# Patient Record
Sex: MALE | Race: WHITE | Employment: OTHER | URBAN - METROPOLITAN AREA
[De-identification: names, ages, dates, MRNs, and addresses within clinical notes are randomized per-mention and may not be internally consistent; named-entity substitution may affect disease eponyms.]

---

## 2019-03-16 ENCOUNTER — ANESTHESIA EVENT (OUTPATIENT)
Dept: OPERATING ROOM | Age: 74
DRG: 856 | End: 2019-03-16
Payer: MEDICARE

## 2019-03-16 ENCOUNTER — ANESTHESIA (OUTPATIENT)
Dept: OPERATING ROOM | Age: 74
DRG: 856 | End: 2019-03-16
Payer: MEDICARE

## 2019-03-16 ENCOUNTER — HOSPITAL ENCOUNTER (INPATIENT)
Age: 74
LOS: 1 days | Discharge: HOME HEALTH | DRG: 856 | End: 2019-03-19
Attending: NEUROLOGICAL SURGERY | Admitting: NEUROLOGICAL SURGERY
Payer: MEDICARE

## 2019-03-16 VITALS — SYSTOLIC BLOOD PRESSURE: 125 MMHG | TEMPERATURE: 98.8 F | DIASTOLIC BLOOD PRESSURE: 69 MMHG | OXYGEN SATURATION: 100 %

## 2019-03-16 PROBLEM — L08.9 WOUND INFECTION: Status: ACTIVE | Noted: 2019-03-16

## 2019-03-16 PROBLEM — T14.8XXA WOUND INFECTION: Status: ACTIVE | Noted: 2019-03-16

## 2019-03-16 LAB
DIRECT EXAM: NORMAL
EKG ATRIAL RATE: 95 BPM
EKG P AXIS: 67 DEGREES
EKG P-R INTERVAL: 156 MS
EKG Q-T INTERVAL: 318 MS
EKG QRS DURATION: 82 MS
EKG QTC CALCULATION (BAZETT): 399 MS
EKG R AXIS: 53 DEGREES
EKG T AXIS: 88 DEGREES
EKG VENTRICULAR RATE: 95 BPM
GLUCOSE BLD-MCNC: 127 MG/DL (ref 75–110)
Lab: NORMAL
SPECIMEN DESCRIPTION: NORMAL

## 2019-03-16 PROCEDURE — 2580000003 HC RX 258: Performed by: NURSE ANESTHETIST, CERTIFIED REGISTERED

## 2019-03-16 PROCEDURE — 6370000000 HC RX 637 (ALT 250 FOR IP): Performed by: NEUROLOGICAL SURGERY

## 2019-03-16 PROCEDURE — 84520 ASSAY OF UREA NITROGEN: CPT

## 2019-03-16 PROCEDURE — 2580000003 HC RX 258: Performed by: ANESTHESIOLOGY

## 2019-03-16 PROCEDURE — 2580000003 HC RX 258: Performed by: NEUROLOGICAL SURGERY

## 2019-03-16 PROCEDURE — 99222 1ST HOSP IP/OBS MODERATE 55: CPT | Performed by: INTERNAL MEDICINE

## 2019-03-16 PROCEDURE — 87186 SC STD MICRODIL/AGAR DIL: CPT

## 2019-03-16 PROCEDURE — 3600000014 HC SURGERY LEVEL 4 ADDTL 15MIN: Performed by: NEUROLOGICAL SURGERY

## 2019-03-16 PROCEDURE — 6370000000 HC RX 637 (ALT 250 FOR IP): Performed by: ANESTHESIOLOGY

## 2019-03-16 PROCEDURE — 2500000003 HC RX 250 WO HCPCS: Performed by: NEUROLOGICAL SURGERY

## 2019-03-16 PROCEDURE — 3600000004 HC SURGERY LEVEL 4 BASE: Performed by: NEUROLOGICAL SURGERY

## 2019-03-16 PROCEDURE — 87040 BLOOD CULTURE FOR BACTERIA: CPT

## 2019-03-16 PROCEDURE — 0J970ZZ DRAINAGE OF BACK SUBCUTANEOUS TISSUE AND FASCIA, OPEN APPROACH: ICD-10-PCS | Performed by: NEUROLOGICAL SURGERY

## 2019-03-16 PROCEDURE — 6360000002 HC RX W HCPCS: Performed by: ANESTHESIOLOGY

## 2019-03-16 PROCEDURE — 87070 CULTURE OTHR SPECIMN AEROBIC: CPT

## 2019-03-16 PROCEDURE — 3700000001 HC ADD 15 MINUTES (ANESTHESIA): Performed by: NEUROLOGICAL SURGERY

## 2019-03-16 PROCEDURE — 3700000000 HC ANESTHESIA ATTENDED CARE: Performed by: NEUROLOGICAL SURGERY

## 2019-03-16 PROCEDURE — 82565 ASSAY OF CREATININE: CPT

## 2019-03-16 PROCEDURE — 2500000003 HC RX 250 WO HCPCS: Performed by: NURSE ANESTHETIST, CERTIFIED REGISTERED

## 2019-03-16 PROCEDURE — 2709999900 HC NON-CHARGEABLE SUPPLY: Performed by: NEUROLOGICAL SURGERY

## 2019-03-16 PROCEDURE — 6360000002 HC RX W HCPCS: Performed by: NEUROLOGICAL SURGERY

## 2019-03-16 PROCEDURE — 6360000002 HC RX W HCPCS: Performed by: NURSE ANESTHETIST, CERTIFIED REGISTERED

## 2019-03-16 PROCEDURE — 86403 PARTICLE AGGLUT ANTBDY SCRN: CPT

## 2019-03-16 PROCEDURE — 7100000001 HC PACU RECOVERY - ADDTL 15 MIN: Performed by: NEUROLOGICAL SURGERY

## 2019-03-16 PROCEDURE — 82947 ASSAY GLUCOSE BLOOD QUANT: CPT

## 2019-03-16 PROCEDURE — G0378 HOSPITAL OBSERVATION PER HR: HCPCS

## 2019-03-16 PROCEDURE — 7100000000 HC PACU RECOVERY - FIRST 15 MIN: Performed by: NEUROLOGICAL SURGERY

## 2019-03-16 PROCEDURE — 87205 SMEAR GRAM STAIN: CPT

## 2019-03-16 PROCEDURE — 87075 CULTR BACTERIA EXCEPT BLOOD: CPT

## 2019-03-16 PROCEDURE — 36415 COLL VENOUS BLD VENIPUNCTURE: CPT

## 2019-03-16 PROCEDURE — 93005 ELECTROCARDIOGRAM TRACING: CPT

## 2019-03-16 RX ORDER — DOCUSATE SODIUM 100 MG/1
100 CAPSULE, LIQUID FILLED ORAL 2 TIMES DAILY
Status: DISCONTINUED | OUTPATIENT
Start: 2019-03-16 | End: 2019-03-19 | Stop reason: HOSPADM

## 2019-03-16 RX ORDER — SODIUM CHLORIDE 0.9 % (FLUSH) 0.9 %
10 SYRINGE (ML) INJECTION EVERY 12 HOURS SCHEDULED
Status: DISCONTINUED | OUTPATIENT
Start: 2019-03-16 | End: 2019-03-16 | Stop reason: HOSPADM

## 2019-03-16 RX ORDER — MORPHINE SULFATE 4 MG/ML
4 INJECTION, SOLUTION INTRAMUSCULAR; INTRAVENOUS
Status: DISCONTINUED | OUTPATIENT
Start: 2019-03-16 | End: 2019-03-19 | Stop reason: HOSPADM

## 2019-03-16 RX ORDER — ONDANSETRON 4 MG/1
4 TABLET, FILM COATED ORAL ONCE
Status: COMPLETED | OUTPATIENT
Start: 2019-03-16 | End: 2019-03-16

## 2019-03-16 RX ORDER — ATORVASTATIN CALCIUM 20 MG/1
20 TABLET, FILM COATED ORAL
COMMUNITY

## 2019-03-16 RX ORDER — SODIUM CHLORIDE 0.9 % (FLUSH) 0.9 %
10 SYRINGE (ML) INJECTION PRN
Status: DISCONTINUED | OUTPATIENT
Start: 2019-03-16 | End: 2019-03-16 | Stop reason: HOSPADM

## 2019-03-16 RX ORDER — MORPHINE SULFATE 2 MG/ML
2 INJECTION, SOLUTION INTRAMUSCULAR; INTRAVENOUS
Status: DISCONTINUED | OUTPATIENT
Start: 2019-03-16 | End: 2019-03-19 | Stop reason: HOSPADM

## 2019-03-16 RX ORDER — ONDANSETRON 2 MG/ML
4 INJECTION INTRAMUSCULAR; INTRAVENOUS EVERY 6 HOURS PRN
Status: DISCONTINUED | OUTPATIENT
Start: 2019-03-16 | End: 2019-03-19 | Stop reason: HOSPADM

## 2019-03-16 RX ORDER — TIZANIDINE 2 MG/1
2 TABLET ORAL
Status: ON HOLD | COMMUNITY
End: 2019-03-19 | Stop reason: HOSPADM

## 2019-03-16 RX ORDER — ONDANSETRON 4 MG/1
4 TABLET, FILM COATED ORAL
Status: ON HOLD | COMMUNITY
Start: 2019-03-14 | End: 2019-03-19 | Stop reason: HOSPADM

## 2019-03-16 RX ORDER — SODIUM CHLORIDE 0.9 % (FLUSH) 0.9 %
10 SYRINGE (ML) INJECTION PRN
Status: DISCONTINUED | OUTPATIENT
Start: 2019-03-16 | End: 2019-03-19 | Stop reason: HOSPADM

## 2019-03-16 RX ORDER — ONDANSETRON 2 MG/ML
INJECTION INTRAMUSCULAR; INTRAVENOUS PRN
Status: DISCONTINUED | OUTPATIENT
Start: 2019-03-16 | End: 2019-03-16 | Stop reason: SDUPTHER

## 2019-03-16 RX ORDER — NEOSTIGMINE METHYLSULFATE 5 MG/5 ML
SYRINGE (ML) INTRAVENOUS PRN
Status: DISCONTINUED | OUTPATIENT
Start: 2019-03-16 | End: 2019-03-16 | Stop reason: SDUPTHER

## 2019-03-16 RX ORDER — SCOLOPAMINE TRANSDERMAL SYSTEM 1 MG/1
1 PATCH, EXTENDED RELEASE TRANSDERMAL
Status: DISCONTINUED | OUTPATIENT
Start: 2019-03-16 | End: 2019-03-16

## 2019-03-16 RX ORDER — OXYCODONE HYDROCHLORIDE AND ACETAMINOPHEN 5; 325 MG/1; MG/1
1 TABLET ORAL EVERY 8 HOURS PRN
COMMUNITY

## 2019-03-16 RX ORDER — SODIUM CHLORIDE, SODIUM LACTATE, POTASSIUM CHLORIDE, CALCIUM CHLORIDE 600; 310; 30; 20 MG/100ML; MG/100ML; MG/100ML; MG/100ML
INJECTION, SOLUTION INTRAVENOUS CONTINUOUS PRN
Status: DISCONTINUED | OUTPATIENT
Start: 2019-03-16 | End: 2019-03-16 | Stop reason: SDUPTHER

## 2019-03-16 RX ORDER — TIZANIDINE 2 MG/1
2 TABLET ORAL 3 TIMES DAILY
Status: DISCONTINUED | OUTPATIENT
Start: 2019-03-16 | End: 2019-03-19 | Stop reason: HOSPADM

## 2019-03-16 RX ORDER — HYDROCODONE BITARTRATE AND ACETAMINOPHEN 5; 325 MG/1; MG/1
2 TABLET ORAL EVERY 4 HOURS PRN
Status: DISCONTINUED | OUTPATIENT
Start: 2019-03-16 | End: 2019-03-19 | Stop reason: HOSPADM

## 2019-03-16 RX ORDER — HYDROCODONE BITARTRATE AND ACETAMINOPHEN 5; 325 MG/1; MG/1
1 TABLET ORAL EVERY 4 HOURS PRN
Status: DISCONTINUED | OUTPATIENT
Start: 2019-03-16 | End: 2019-03-19 | Stop reason: HOSPADM

## 2019-03-16 RX ORDER — CEFAZOLIN SODIUM 1 G/3ML
INJECTION, POWDER, FOR SOLUTION INTRAMUSCULAR; INTRAVENOUS PRN
Status: DISCONTINUED | OUTPATIENT
Start: 2019-03-16 | End: 2019-03-16 | Stop reason: SDUPTHER

## 2019-03-16 RX ORDER — SODIUM CHLORIDE, SODIUM LACTATE, POTASSIUM CHLORIDE, CALCIUM CHLORIDE 600; 310; 30; 20 MG/100ML; MG/100ML; MG/100ML; MG/100ML
INJECTION, SOLUTION INTRAVENOUS CONTINUOUS
Status: DISCONTINUED | OUTPATIENT
Start: 2019-03-16 | End: 2019-03-16

## 2019-03-16 RX ORDER — SODIUM CHLORIDE 9 MG/ML
INJECTION, SOLUTION INTRAVENOUS CONTINUOUS
Status: DISCONTINUED | OUTPATIENT
Start: 2019-03-16 | End: 2019-03-19 | Stop reason: HOSPADM

## 2019-03-16 RX ORDER — FENTANYL CITRATE 50 UG/ML
25 INJECTION, SOLUTION INTRAMUSCULAR; INTRAVENOUS EVERY 5 MIN PRN
Status: DISCONTINUED | OUTPATIENT
Start: 2019-03-16 | End: 2019-03-16 | Stop reason: HOSPADM

## 2019-03-16 RX ORDER — GLYCOPYRROLATE 1 MG/5 ML
SYRINGE (ML) INTRAVENOUS PRN
Status: DISCONTINUED | OUTPATIENT
Start: 2019-03-16 | End: 2019-03-16 | Stop reason: SDUPTHER

## 2019-03-16 RX ORDER — PROPOFOL 10 MG/ML
INJECTION, EMULSION INTRAVENOUS PRN
Status: DISCONTINUED | OUTPATIENT
Start: 2019-03-16 | End: 2019-03-16 | Stop reason: SDUPTHER

## 2019-03-16 RX ORDER — ATORVASTATIN CALCIUM 20 MG/1
20 TABLET, FILM COATED ORAL DAILY
Status: DISCONTINUED | OUTPATIENT
Start: 2019-03-16 | End: 2019-03-19 | Stop reason: HOSPADM

## 2019-03-16 RX ORDER — FENTANYL CITRATE 50 UG/ML
50 INJECTION, SOLUTION INTRAMUSCULAR; INTRAVENOUS EVERY 5 MIN PRN
Status: DISCONTINUED | OUTPATIENT
Start: 2019-03-16 | End: 2019-03-16 | Stop reason: HOSPADM

## 2019-03-16 RX ORDER — AMLODIPINE BESYLATE 2.5 MG/1
2.5 TABLET ORAL DAILY
Status: DISCONTINUED | OUTPATIENT
Start: 2019-03-16 | End: 2019-03-19 | Stop reason: HOSPADM

## 2019-03-16 RX ORDER — SODIUM CHLORIDE 0.9 % (FLUSH) 0.9 %
10 SYRINGE (ML) INJECTION EVERY 12 HOURS SCHEDULED
Status: DISCONTINUED | OUTPATIENT
Start: 2019-03-16 | End: 2019-03-19 | Stop reason: HOSPADM

## 2019-03-16 RX ORDER — LIDOCAINE HYDROCHLORIDE 10 MG/ML
INJECTION, SOLUTION EPIDURAL; INFILTRATION; INTRACAUDAL; PERINEURAL PRN
Status: DISCONTINUED | OUTPATIENT
Start: 2019-03-16 | End: 2019-03-16 | Stop reason: SDUPTHER

## 2019-03-16 RX ORDER — CEPHALEXIN 250 MG/1
250 CAPSULE ORAL
Status: ON HOLD | COMMUNITY
End: 2019-03-18 | Stop reason: HOSPADM

## 2019-03-16 RX ORDER — AMLODIPINE BESYLATE 2.5 MG/1
2.5 TABLET ORAL DAILY
COMMUNITY

## 2019-03-16 RX ORDER — ROCURONIUM BROMIDE 10 MG/ML
INJECTION, SOLUTION INTRAVENOUS PRN
Status: DISCONTINUED | OUTPATIENT
Start: 2019-03-16 | End: 2019-03-16 | Stop reason: SDUPTHER

## 2019-03-16 RX ADMIN — Medication 10 ML: at 20:23

## 2019-03-16 RX ADMIN — FENTANYL CITRATE 50 MCG: 50 INJECTION, SOLUTION INTRAMUSCULAR; INTRAVENOUS at 12:04

## 2019-03-16 RX ADMIN — ONDANSETRON 4 MG: 2 INJECTION INTRAMUSCULAR; INTRAVENOUS at 09:45

## 2019-03-16 RX ADMIN — Medication 3 MG: at 09:55

## 2019-03-16 RX ADMIN — SODIUM CHLORIDE, POTASSIUM CHLORIDE, SODIUM LACTATE AND CALCIUM CHLORIDE: 600; 310; 30; 20 INJECTION, SOLUTION INTRAVENOUS at 08:28

## 2019-03-16 RX ADMIN — Medication 0.4 MG: at 09:57

## 2019-03-16 RX ADMIN — DOCUSATE SODIUM 100 MG: 100 CAPSULE, LIQUID FILLED ORAL at 20:22

## 2019-03-16 RX ADMIN — ONDANSETRON HYDROCHLORIDE 4 MG: 4 TABLET, FILM COATED ORAL at 14:39

## 2019-03-16 RX ADMIN — HYDROCODONE BITARTRATE AND ACETAMINOPHEN 2 TABLET: 5; 325 TABLET ORAL at 13:42

## 2019-03-16 RX ADMIN — ROCURONIUM BROMIDE 30 MG: 10 INJECTION INTRAVENOUS at 09:36

## 2019-03-16 RX ADMIN — AMLODIPINE BESYLATE 2.5 MG: 2.5 TABLET ORAL at 13:43

## 2019-03-16 RX ADMIN — HYDROCODONE BITARTRATE AND ACETAMINOPHEN 2 TABLET: 5; 325 TABLET ORAL at 23:07

## 2019-03-16 RX ADMIN — TIZANIDINE 2 MG: 2 TABLET ORAL at 13:43

## 2019-03-16 RX ADMIN — DOCUSATE SODIUM 100 MG: 100 CAPSULE, LIQUID FILLED ORAL at 13:43

## 2019-03-16 RX ADMIN — VANCOMYCIN HYDROCHLORIDE 1500 MG: 10 INJECTION, POWDER, LYOPHILIZED, FOR SOLUTION INTRAVENOUS at 14:39

## 2019-03-16 RX ADMIN — LIDOCAINE HYDROCHLORIDE 50 MG: 10 INJECTION, SOLUTION EPIDURAL; INFILTRATION; INTRACAUDAL; PERINEURAL at 09:36

## 2019-03-16 RX ADMIN — SODIUM CHLORIDE, POTASSIUM CHLORIDE, SODIUM LACTATE AND CALCIUM CHLORIDE: 600; 310; 30; 20 INJECTION, SOLUTION INTRAVENOUS at 09:15

## 2019-03-16 RX ADMIN — FENTANYL CITRATE 100 MCG: 50 INJECTION INTRAMUSCULAR; INTRAVENOUS at 09:36

## 2019-03-16 RX ADMIN — ATORVASTATIN CALCIUM 20 MG: 20 TABLET, FILM COATED ORAL at 13:43

## 2019-03-16 RX ADMIN — HYDROCODONE BITARTRATE AND ACETAMINOPHEN 2 TABLET: 5; 325 TABLET ORAL at 18:56

## 2019-03-16 RX ADMIN — CEFAZOLIN 2000 MG: 1 INJECTION, POWDER, FOR SOLUTION INTRAMUSCULAR; INTRAVENOUS at 09:22

## 2019-03-16 RX ADMIN — PROPOFOL 150 MG: 10 INJECTION, EMULSION INTRAVENOUS at 09:36

## 2019-03-16 RX ADMIN — PROPOFOL 100 MG: 10 INJECTION, EMULSION INTRAVENOUS at 09:53

## 2019-03-16 RX ADMIN — TIZANIDINE 2 MG: 2 TABLET ORAL at 20:22

## 2019-03-16 SDOH — HEALTH STABILITY: MENTAL HEALTH: HOW OFTEN DO YOU HAVE A DRINK CONTAINING ALCOHOL?: NEVER

## 2019-03-16 ASSESSMENT — PULMONARY FUNCTION TESTS
PIF_VALUE: 20
PIF_VALUE: 20
PIF_VALUE: 21
PIF_VALUE: 29
PIF_VALUE: 21
PIF_VALUE: 2
PIF_VALUE: 21
PIF_VALUE: 20
PIF_VALUE: 18
PIF_VALUE: 19
PIF_VALUE: 27
PIF_VALUE: 3
PIF_VALUE: 2
PIF_VALUE: 21
PIF_VALUE: 7
PIF_VALUE: 2
PIF_VALUE: 20
PIF_VALUE: 21
PIF_VALUE: 6
PIF_VALUE: 2
PIF_VALUE: 20
PIF_VALUE: 4
PIF_VALUE: 2
PIF_VALUE: 1
PIF_VALUE: 20
PIF_VALUE: 21
PIF_VALUE: 1
PIF_VALUE: 20
PIF_VALUE: 22
PIF_VALUE: 21
PIF_VALUE: 20
PIF_VALUE: 3
PIF_VALUE: 3
PIF_VALUE: 20
PIF_VALUE: 20
PIF_VALUE: 3
PIF_VALUE: 20
PIF_VALUE: 21
PIF_VALUE: 20
PIF_VALUE: 20
PIF_VALUE: 21
PIF_VALUE: 3
PIF_VALUE: 20
PIF_VALUE: 21
PIF_VALUE: 20
PIF_VALUE: 20
PIF_VALUE: 21
PIF_VALUE: 21
PIF_VALUE: 20
PIF_VALUE: 2
PIF_VALUE: 20
PIF_VALUE: 22
PIF_VALUE: 7
PIF_VALUE: 20

## 2019-03-16 ASSESSMENT — ENCOUNTER SYMPTOMS
ABDOMINAL DISTENTION: 0
COLOR CHANGE: 0
ABDOMINAL PAIN: 0
CHOKING: 0
CHEST TIGHTNESS: 0
BACK PAIN: 1
EYE REDNESS: 0
EYE PAIN: 0

## 2019-03-16 ASSESSMENT — PAIN SCALES - GENERAL
PAINLEVEL_OUTOF10: 0
PAINLEVEL_OUTOF10: 8
PAINLEVEL_OUTOF10: 6
PAINLEVEL_OUTOF10: 7
PAINLEVEL_OUTOF10: 4
PAINLEVEL_OUTOF10: 7
PAINLEVEL_OUTOF10: 4
PAINLEVEL_OUTOF10: 0
PAINLEVEL_OUTOF10: 3
PAINLEVEL_OUTOF10: 0
PAINLEVEL_OUTOF10: 0
PAINLEVEL_OUTOF10: 7
PAINLEVEL_OUTOF10: 3

## 2019-03-16 ASSESSMENT — PAIN DESCRIPTION - DESCRIPTORS: DESCRIPTORS: SHARP;ACHING;BURNING

## 2019-03-16 ASSESSMENT — PAIN - FUNCTIONAL ASSESSMENT: PAIN_FUNCTIONAL_ASSESSMENT: 0-10

## 2019-03-16 NOTE — CONSULTS
[facility] -   Infectious diseases evaluation  admission date 3/16/2019    reason for consultation:   Spinal wound infection    Impression :   Current:  · Lumbar Spinal abscess - I/D 3/16/19  · Possible  osteomyelitis LS spine  · Recent laminectomy for L2-3 disk herniation - 2 weeks PTA  ·       Discussion / summary of stay / plan of care   ·   Recommendations   · vanco iv fo now pend final cx  · Watch creat under vanco  · Will need 6 weeks total antibiotics for [resu,ed OM of the spine  · Option are po or iv - according to final cxs  · Disc w pt and wife -they want the fastest way t recovery    Infection Control Recommendations   · [place] Precautions  · Contact Isolation     Antimicrobial Stewardship Recommendations   · Simplification of therapy  · Targeted therapy    Coordination ofOutpatient Care:   · Estimated Length of IV antimicrobials:  · Patient will need Midline / picc Catheter Insertion:   · Patient will need SNF:  · Patient will need outpatient wound care:     History of Present Illness:   Initial history:  Ortiz Sanchez is a 74y.o.-year-old male post lumbar disc L2-3 surgery 2 weeks ago, started noticing a  Drainage last week and purulence then a progressive pain till he could not walk well, admitted for sx site infection, had an I&D 3/16/19 by Dr. Dorothea Harvey. cx w PC pairs and started on vanco  No fever all along till right after the sx when he felt very warm w 99.1F. No leukocytosis - has a drain in place and is able to move 4 limbs well in bed. No dysuria and no constipation. Interval changes  3/16/2019     Summary of relevant labs:  Labs:    Micro:  Spinal wound culture 3/16, gram-positive cocci in pairs  Imaging:      I have personally reviewed the past medical history, past surgical history, medications, social history, and family history, and I haveupdated the database accordingly.   Past Medical History:     Past Medical History:   Diagnosis Date  CAD (coronary artery disease)     Hyperlipidemia     Lumbar herniated disc     Wears partial dentures     upper        Past Surgical  History:     Past Surgical History:   Procedure Laterality Date    ABSCESS DRAINAGE  2019    I/D Lumbar Wound    CHOLECYSTECTOMY      CORONARY ARTERY BYPASS GRAFT  10/18/2000    triple bypass/ s/p failed cardiac cath, pt states \"I coded in cardiac cath lab\"    INGUINAL HERNIA REPAIR      with mesh    LUMBAR DISC SURGERY  2019       Medications:      tiZANidine  2 mg Oral TID    atorvastatin  20 mg Oral Daily    amLODIPine  2.5 mg Oral Daily    sodium chloride flush  10 mL Intravenous 2 times per day    docusate sodium  100 mg Oral BID    vancomycin  1,500 mg Intravenous Q12H       Social History:     Social History     Socioeconomic History    Marital status:      Spouse name: Not on file    Number of children: Not on file    Years of education: Not on file    Highest education level: Not on file   Occupational History    Not on file   Social Needs    Financial resource strain: Not on file    Food insecurity:     Worry: Not on file     Inability: Not on file    Transportation needs:     Medical: Not on file     Non-medical: Not on file   Tobacco Use    Smoking status: Former Smoker     Last attempt to quit: 3/16/1996     Years since quittin.0    Smokeless tobacco: Never Used   Substance and Sexual Activity    Alcohol use: Not Currently     Frequency: Never    Drug use: Not on file    Sexual activity: Not on file   Lifestyle    Physical activity:     Days per week: Not on file     Minutes per session: Not on file    Stress: Not on file   Relationships    Social connections:     Talks on phone: Not on file     Gets together: Not on file     Attends Jew service: Not on file     Active member of club or organization: Not on file     Attends meetings of clubs or organizations: Not on file     Relationship status: Not on file    Intimate partner violence:     Fear of current or ex partner: Not on file     Emotionally abused: Not on file     Physically abused: Not on file     Forced sexual activity: Not on file   Other Topics Concern    Not on file   Social History Narrative    Not on file       Family History:   History reviewed. No pertinent family history. Allergies:   Patient has no known allergies. Review of Systems:     Review of Systems   Constitutional: Negative for chills and fever. HENT: Negative for congestion. Eyes: Negative for pain and redness. Respiratory: Negative for choking and chest tightness. Cardiovascular: Negative for chest pain. Gastrointestinal: Negative for abdominal distention and abdominal pain. Endocrine: Negative for polydipsia and polyphagia. Genitourinary: Negative for dysuria. Musculoskeletal: Positive for back pain. Negative for arthralgias. Skin: Negative for color change. Allergic/Immunologic: Negative for food allergies. Neurological: Negative for headaches. Hematological: Does not bruise/bleed easily. Psychiatric/Behavioral: Negative for agitation. Physical Examination :     Patient Vitals for the past 8 hrs:   BP Temp Temp src Pulse Resp SpO2   03/16/19 1616 -- -- -- -- 17 99 %   03/16/19 1230 -- -- -- 78 -- --   03/16/19 1227 (!) 118/57 -- -- 86 17 --   03/16/19 1215 (!) 112/91 -- -- 84 19 99 %   03/16/19 1200 115/67 -- -- 87 22 99 %   03/16/19 1130 (!) 124/58 -- -- 88 14 98 %   03/16/19 1115 (!) 120/59 97.3 °F (36.3 °C) -- 83 16 97 %   03/16/19 1100 123/63 -- -- 95 17 97 %   03/16/19 1045 (!) 131/59 -- -- 92 18 96 %   03/16/19 1030 136/65 -- -- 95 15 95 %   03/16/19 1015 133/66 97.7 °F (36.5 °C) Temporal 84 16 98 %       Physical Exam   Constitutional: He is oriented to person, place, and time. He appears well-developed and well-nourished. HENT:   Head: Normocephalic and atraumatic. Mouth/Throat: No oropharyngeal exudate.    Eyes: Conjunctivae are normal. No scleral icterus. Neck: Neck supple. No tracheal deviation present. Cardiovascular: Normal rate and regular rhythm. Exam reveals no friction rub. No murmur heard. Pulmonary/Chest: Breath sounds normal. No stridor. No respiratory distress. Abdominal: Soft. He exhibits no distension. There is no tenderness. Genitourinary: Penis normal.   Genitourinary Comments: No watson   Musculoskeletal: He exhibits no edema or deformity. Neurological: He is alert and oriented to person, place, and time. No cranial nerve deficit. Coordination normal.   Skin: Skin is warm and dry. No rash noted. No erythema. Spinal wound w a drain   Psychiatric: He has a normal mood and affect. His behavior is normal.         Medical Decision Making:   I have independently reviewed/ordered the following labs:    CBC with Differential: No results for input(s): WBC, HGB, HCT, PLT, SEGSPCT, BANDSPCT, LYMPHOPCT, MONOPCT, EOSPCT in the last 72 hours. BMP:No results for input(s): NA, K, CL, CO2, BUN, CREATININE, MG in the last 72 hours. Invalid input(s): CA  Hepatic Function Panel: No results for input(s): PROT, LABALBU, BILIDIR, IBILI, BILITOT, ALKPHOS, ALT, AST in the last 72 hours. No results for input(s): RPR in the last 72 hours. No results for input(s): HIV in the last 72 hours. No results for input(s): BC in the last 72 hours. No results found for: CREATININE, GLUCOSE, KETONES    Detailed results: Thank you for allowing us to participate in the care of this patient. Please call with questions. This note is created with the assistance of a speech recognition program.  While intending to generate adocument that actually reflects the content of the visit, the document can still have some errors including those of syntax and sound a like substitutions which may escape proof reading. It such instances, actual meaningcan be extrapolated by contextual diversion.     Lillian Pierce MD  Office:

## 2019-03-16 NOTE — PLAN OF CARE
Pain level assessment complete. Pt rated pain at 3/10. Pt educated on pain scale and control interventions. PRN pain medication given per pt request. Pt instructed to call out with new onset of pain or unrelieved pain. Will continue to monitor.

## 2019-03-16 NOTE — H&P
Patient is a 74year old male s/p lumbar wound surgery by Dr. Tayler Hendrickson approximately two weeks ago. Patient continues to experience drainage from his incision despite ABX treatment with Keflex. Patient to undergo lumbar wound I and D today. Past Medical History:   Diagnosis Date    CAD (coronary artery disease)     Hyperlipidemia     Lumbar herniated disc     Wears partial dentures     upper      Past Surgical History:   Procedure Laterality Date    CHOLECYSTECTOMY  2004    CORONARY ARTERY BYPASS GRAFT  10/18/2000    triple bypass/ s/p failed cardiac cath, pt states \"I coded in cardiac cath lab\"    INGUINAL HERNIA REPAIR  1990's    with mesh    LUMBAR DISC SURGERY  2/ 27/ 2019     No Known Allergies  No current facility-administered medications on file prior to encounter. Current Outpatient Medications on File Prior to Encounter   Medication Sig Dispense Refill    ondansetron (ZOFRAN) 4 MG tablet Take 4 mg by mouth      tiZANidine (ZANAFLEX) 2 MG tablet Take 2 mg by mouth      amLODIPine (NORVASC) 2.5 MG tablet Take 2.5 mg by mouth daily      oxyCODONE-acetaminophen (PERCOCET) 5-325 MG per tablet Take 1 tablet by mouth every 8 hours as needed for Pain.       atorvastatin (LIPITOR) 20 MG tablet Take 20 mg by mouth      Multiple Vitamins-Minerals (MULTIVITAL-M PO) Take 1 tablet by mouth      cephALEXin (KEFLEX) 250 MG capsule Take 250 mg by mouth       Social History     Tobacco History     Smoking Status  Former Smoker Quit date  3/16/1996    Smokeless Tobacco Use  Never Used          Alcohol History     Alcohol Use Status  Not Currently          Drug Use     Drug Use Status  Not Asked          Sexual Activity     Sexually Active  Not Asked              General: Patient is a pleasant 74year old male resting comfortably in no apparent distress  Back: Lumbar incision, slightly erythemetous  CV: RRR, no murmur  Lungs: Clear to auscultation bilaterally  Abdomen: soft, nontender, nondistended  Ext: warm to touch, no C/C/E. Radial pulse 2+  Neuro:Patient is awake alert follows all commands appropriately with clear fluent speech. Comprehension is normal. No focal cranial nerve deficits. Pupils are 3 mm equal and reactive to light with full EOM and conjugate gaze. Facial expression symmetric. Tongue midline. Shoulder shrug equal.  Normal strength and sensation in both UE and LE. A/P: Patient to undergo lumbar  I and D for nonhealing lumbar wound s/p lumbar laminectomy 2 weeks ago.    Consult to ID

## 2019-03-16 NOTE — PROGRESS NOTES
surgical history, medications, social history, and family history, and I haveupdated the database accordingly.   Past Medical History:     Past Medical History:   Diagnosis Date    CAD (coronary artery disease)     Hyperlipidemia     Lumbar herniated disc     Wears partial dentures     upper        Past Surgical  History:     Past Surgical History:   Procedure Laterality Date    ABSCESS DRAINAGE  2019    I/D Lumbar Wound    CHOLECYSTECTOMY      CORONARY ARTERY BYPASS GRAFT  10/18/2000    triple bypass/ s/p failed cardiac cath, pt states \"I coded in cardiac cath lab\"    INGUINAL HERNIA REPAIR      with mesh    LUMBAR DISC SURGERY  2019       Medications:      vancomycin  1,250 mg Intravenous Q12H    tiZANidine  2 mg Oral TID    atorvastatin  20 mg Oral Daily    amLODIPine  2.5 mg Oral Daily    sodium chloride flush  10 mL Intravenous 2 times per day    docusate sodium  100 mg Oral BID    vancomycin (VANCOCIN) intermittent dosing (placeholder)   Other RX Placeholder       Social History:     Social History     Socioeconomic History    Marital status:      Spouse name: Not on file    Number of children: Not on file    Years of education: Not on file    Highest education level: Not on file   Occupational History    Not on file   Social Needs    Financial resource strain: Not on file    Food insecurity:     Worry: Not on file     Inability: Not on file    Transportation needs:     Medical: Not on file     Non-medical: Not on file   Tobacco Use    Smoking status: Former Smoker     Last attempt to quit: 3/16/1996     Years since quittin.0    Smokeless tobacco: Never Used   Substance and Sexual Activity    Alcohol use: Not Currently     Frequency: Never    Drug use: Not on file    Sexual activity: Not on file   Lifestyle    Physical activity:     Days per week: Not on file     Minutes per session: Not on file    Stress: Not on file   Relationships    Social connections:     Talks on phone: Not on file     Gets together: Not on file     Attends Taoism service: Not on file     Active member of club or organization: Not on file     Attends meetings of clubs or organizations: Not on file     Relationship status: Not on file    Intimate partner violence:     Fear of current or ex partner: Not on file     Emotionally abused: Not on file     Physically abused: Not on file     Forced sexual activity: Not on file   Other Topics Concern    Not on file   Social History Narrative    Not on file       Family History:   History reviewed. No pertinent family history. Allergies:   Patient has no known allergies. Review of Systems:     Review of Systems   Constitutional: Negative for chills and fever. HENT: Negative for mouth sores and sore throat. Eyes: Negative for discharge and itching. Respiratory: Negative for cough and shortness of breath. Cardiovascular: Negative for chest pain and leg swelling. Gastrointestinal: Positive for abdominal distention and constipation. Endocrine: Negative. Genitourinary: Negative for difficulty urinating and dysuria. Musculoskeletal: Positive for back pain. Negative for joint swelling. Skin: Negative for color change and rash. Allergic/Immunologic: Negative. Neurological: Negative for dizziness and light-headedness. Hematological: Negative. Psychiatric/Behavioral: Negative for agitation and confusion. Physical Examination :     Patient Vitals for the past 8 hrs:   BP Temp Temp src Pulse Resp SpO2   03/17/19 1100 (!) 112/56 98 °F (36.7 °C) Oral 70 16 95 %   03/17/19 0855 -- -- -- -- 18 95 %       Physical Exam   Constitutional: He is oriented to person, place, and time. He appears well-developed and well-nourished. No distress. HENT:   Head: Normocephalic and atraumatic. Mouth/Throat: Oropharynx is clear and moist.   Eyes: Pupils are equal, round, and reactive to light.  Conjunctivae and EOM are normal.   Neck: Normal range of motion. Neck supple. No tracheal deviation present. Cardiovascular: Normal rate, regular rhythm and normal heart sounds. Pulmonary/Chest: Effort normal and breath sounds normal. No respiratory distress. Abdominal: Bowel sounds are normal. He exhibits distension. There is no tenderness. Firm. Distended. Genitourinary:   Genitourinary Comments: No watson. Musculoskeletal: Normal range of motion. He exhibits no edema or deformity. ROM normal to ext x 4. Neurological: He is alert and oriented to person, place, and time. No cranial nerve deficit. Skin: Skin is warm and dry. Capillary refill takes less than 2 seconds. No rash noted. Psychiatric: He has a normal mood and affect. His behavior is normal. Judgment and thought content normal.   Nursing note and vitals reviewed. Medical Decision Making:   I have independently reviewed/ordered the following labs:    CBC with Differential: No results for input(s): WBC, HGB, HCT, PLT, SEGSPCT, BANDSPCT, LYMPHOPCT, MONOPCT, EOSPCT in the last 72 hours. BMP:  Recent Labs     03/16/19  2357   BUN 12   CREATININE 0.65*     Hepatic Function Panel: No results for input(s): PROT, LABALBU, BILIDIR, IBILI, BILITOT, ALKPHOS, ALT, AST in the last 72 hours. No results for input(s): RPR in the last 72 hours. No results for input(s): HIV in the last 72 hours. No results for input(s): BC in the last 72 hours. Lab Results   Component Value Date    CREATININE 0.65 03/16/2019       Detailed results: Thank you for allowing us to participate in the care of this patient. Please call with questions. This note is created with the assistance of a speech recognition program.  While intending to generate adocument that actually reflects the content of the visit, the document can still have some errors including those of syntax and sound a like substitutions which may escape proof reading.   It such instances, actual meaningcan be

## 2019-03-16 NOTE — BRIEF OP NOTE
Brief Postoperative Note  ______________________________________________________________    Patient: Konrad Nelson  YOB: 1945  MRN: [id number]  Date of Procedure: 3/16/2019    Pre-Op Diagnosis: WOUND INFECTION    Post-Op Diagnosis: Same       Procedure(s):  LUMBAR  WOUND INCISION AND DRAINAGE    Anesthesia: General    Surgeon(s):  Gilbert Acevedo MD    Assistant: Joel Montes PA-C  Estimated Blood Loss (mL): 67SL    Complications: none    Specimens:   ID Type Source Tests Collected by Time Destination   1 : LUMBAR WOUND Swab Back GRAM STAIN, ANAEROBIC AND AEROBIC CULTURE Gilbert Acevedo MD 3/16/2019 6747        Implants:none      Drains:   Closed/Suction Drain Midline Back Accordion 10 [place] (Active)       Findings: as above    Gilbert Acevedo MD  Date: 3/16/2019  Time: 10:07 AM

## 2019-03-17 LAB
BUN BLDV-MCNC: 12 MG/DL (ref 8–23)
CREAT SERPL-MCNC: 0.65 MG/DL (ref 0.7–1.2)
GFR AFRICAN AMERICAN: >60 ML/MIN
GFR NON-AFRICAN AMERICAN: >60 ML/MIN
GFR SERPL CREATININE-BSD FRML MDRD: ABNORMAL ML/MIN/{1.73_M2}
GFR SERPL CREATININE-BSD FRML MDRD: ABNORMAL ML/MIN/{1.73_M2}
GLUCOSE BLD-MCNC: 99 MG/DL (ref 75–110)

## 2019-03-17 PROCEDURE — G0378 HOSPITAL OBSERVATION PER HR: HCPCS

## 2019-03-17 PROCEDURE — 6360000002 HC RX W HCPCS: Performed by: INTERNAL MEDICINE

## 2019-03-17 PROCEDURE — 2580000003 HC RX 258: Performed by: INTERNAL MEDICINE

## 2019-03-17 PROCEDURE — 94760 N-INVAS EAR/PLS OXIMETRY 1: CPT

## 2019-03-17 PROCEDURE — APPSS45 APP SPLIT SHARED TIME 31-45 MINUTES: Performed by: NURSE PRACTITIONER

## 2019-03-17 PROCEDURE — 6370000000 HC RX 637 (ALT 250 FOR IP): Performed by: STUDENT IN AN ORGANIZED HEALTH CARE EDUCATION/TRAINING PROGRAM

## 2019-03-17 PROCEDURE — 99232 SBSQ HOSP IP/OBS MODERATE 35: CPT | Performed by: INTERNAL MEDICINE

## 2019-03-17 PROCEDURE — 6370000000 HC RX 637 (ALT 250 FOR IP): Performed by: NEUROLOGICAL SURGERY

## 2019-03-17 PROCEDURE — 2580000003 HC RX 258: Performed by: NEUROLOGICAL SURGERY

## 2019-03-17 RX ORDER — POLYETHYLENE GLYCOL 3350 17 G/17G
17 POWDER, FOR SOLUTION ORAL DAILY PRN
Status: DISCONTINUED | OUTPATIENT
Start: 2019-03-17 | End: 2019-03-19 | Stop reason: HOSPADM

## 2019-03-17 RX ORDER — SENNA AND DOCUSATE SODIUM 50; 8.6 MG/1; MG/1
2 TABLET, FILM COATED ORAL DAILY PRN
Status: DISCONTINUED | OUTPATIENT
Start: 2019-03-17 | End: 2019-03-19 | Stop reason: HOSPADM

## 2019-03-17 RX ADMIN — DOCUSATE SODIUM 100 MG: 100 CAPSULE, LIQUID FILLED ORAL at 10:33

## 2019-03-17 RX ADMIN — HYDROCODONE BITARTRATE AND ACETAMINOPHEN 2 TABLET: 5; 325 TABLET ORAL at 10:36

## 2019-03-17 RX ADMIN — VANCOMYCIN HYDROCHLORIDE 1250 MG: 10 INJECTION, POWDER, LYOPHILIZED, FOR SOLUTION INTRAVENOUS at 02:24

## 2019-03-17 RX ADMIN — HYDROCODONE BITARTRATE AND ACETAMINOPHEN 2 TABLET: 5; 325 TABLET ORAL at 14:24

## 2019-03-17 RX ADMIN — Medication 10 ML: at 21:22

## 2019-03-17 RX ADMIN — TIZANIDINE 2 MG: 2 TABLET ORAL at 14:24

## 2019-03-17 RX ADMIN — DOCUSATE SODIUM 100 MG: 100 CAPSULE, LIQUID FILLED ORAL at 21:22

## 2019-03-17 RX ADMIN — ATORVASTATIN CALCIUM 20 MG: 20 TABLET, FILM COATED ORAL at 10:33

## 2019-03-17 RX ADMIN — TIZANIDINE 2 MG: 2 TABLET ORAL at 10:32

## 2019-03-17 RX ADMIN — HYDROCODONE BITARTRATE AND ACETAMINOPHEN 2 TABLET: 5; 325 TABLET ORAL at 18:54

## 2019-03-17 RX ADMIN — HYDROCODONE BITARTRATE AND ACETAMINOPHEN 2 TABLET: 5; 325 TABLET ORAL at 04:07

## 2019-03-17 RX ADMIN — VANCOMYCIN HYDROCHLORIDE 1250 MG: 10 INJECTION, POWDER, LYOPHILIZED, FOR SOLUTION INTRAVENOUS at 14:24

## 2019-03-17 RX ADMIN — SENNOSIDES AND DOCUSATE SODIUM 2 TABLET: 8.6; 5 TABLET ORAL at 18:55

## 2019-03-17 RX ADMIN — AMLODIPINE BESYLATE 2.5 MG: 2.5 TABLET ORAL at 10:33

## 2019-03-17 RX ADMIN — Medication 10 ML: at 10:32

## 2019-03-17 RX ADMIN — POLYETHYLENE GLYCOL 3350 17 G: 17 POWDER, FOR SOLUTION ORAL at 18:55

## 2019-03-17 RX ADMIN — TIZANIDINE 2 MG: 2 TABLET ORAL at 21:22

## 2019-03-17 ASSESSMENT — ENCOUNTER SYMPTOMS
ALLERGIC/IMMUNOLOGIC NEGATIVE: 1
CONSTIPATION: 1
SORE THROAT: 0
COUGH: 0
COLOR CHANGE: 0
BACK PAIN: 1
SHORTNESS OF BREATH: 0
EYE ITCHING: 0
EYE DISCHARGE: 0
ABDOMINAL DISTENTION: 1

## 2019-03-17 ASSESSMENT — PAIN SCALES - GENERAL
PAINLEVEL_OUTOF10: 8
PAINLEVEL_OUTOF10: 7
PAINLEVEL_OUTOF10: 7
PAINLEVEL_OUTOF10: 4

## 2019-03-17 NOTE — PLAN OF CARE
Problem: Infection:  Goal: Will remain free from infection  Description  Will remain free from infection  3/17/2019 1758 by Bethany Massey RN  Outcome: Ongoing  3/17/2019 0431 by Jessika Dye RN  Outcome: Met This Shift     Problem: Safety:  Goal: Free from accidental physical injury  Description  Free from accidental physical injury  3/17/2019 1758 by Bethany Massey RN  Outcome: Ongoing  3/17/2019 0431 by Jessika Dye RN  Outcome: Met This Shift  Goal: Free from intentional harm  Description  Free from intentional harm  3/17/2019 1758 by Bethany Massey RN  Outcome: Ongoing  3/17/2019 0431 by Jessika Dye RN  Outcome: Met This Shift     Problem: Daily Care:  Goal: Daily care needs are met  Description  Daily care needs are met  3/17/2019 1758 by Bethany Massey RN  Outcome: Ongoing  3/17/2019 0431 by Jessika Dye RN  Outcome: Met This Shift     Problem: Pain:  Goal: Patient's pain/discomfort is manageable  Description  Patient's pain/discomfort is manageable  3/17/2019 1758 by Bethany Massey RN  Outcome: Ongoing  3/17/2019 0431 by Jessika Dye RN  Outcome: Met This Shift     Problem: Skin Integrity:  Goal: Skin integrity will stabilize  Description  Skin integrity will stabilize  3/17/2019 1758 by Bethany Massey RN  Outcome: Ongoing  3/17/2019 0431 by Jessika Dye RN  Outcome: Met This Shift     Problem: Discharge Planning:  Goal: Patients continuum of care needs are met  Description  Patients continuum of care needs are met  3/17/2019 1758 by Bethany Massey RN  Outcome: Ongoing  3/17/2019 0431 by Jessika Dye RN  Outcome: Met This Shift     Problem: Falls - Risk of:  Goal: Will remain free from falls  Description  Will remain free from falls  3/17/2019 1758 by Bethany Massey RN  Outcome: Ongoing  3/17/2019 0431 by Jessika Dye RN  Outcome: Met This Shift  Goal: Absence of physical injury  Description  Absence of physical injury  3/17/2019 1758 by Bethany Massey RN  Outcome: Ongoing  3/17/2019 0431 by Shena Javed RN  Outcome: Met This Shift

## 2019-03-17 NOTE — CARE COORDINATION
Case Management Initial Discharge Plan  Sudha Woodward,             Met with:spouse, patient and son Bulmaro Ramos to discuss discharge plans. Information verified: address, contacts, phone number, , insurance Yes  PCP: Vinh Montes MD  Date of last visit:     Insurance Provider: Medicare/MMO    Discharge Planning    Living Arrangements:  Spouse/Significant Other   Support Systems:  Spouse/Significant Other, Family Members, [id number] has 2 stories  steep stairs to climb to get into front door, flight of stairs to climb to reach second floor  Location of bedroom/bathroom in home     Patient able to perform ADL's:Independent prior to surgery two weeks ago. Required assistance last two weeks. Current Services (outpatient & in home) Home Care, family doesn't remember name, stated they thought it was a new company. DME equipment:   DME provider:     Pharmacy: [id number] [facility]   Potential Assistance Purchasing Medications:  No  Does patient want to participate in local refill/ meds to beds program?  No    Potential Assistance Needed:  [address]    Patient agreeable to home care: Yes  Freedom of choice provided:  yes    Prior SNF/Rehab Placement and Facility:   Agreeable to SNF/Rehab: If needed  [place] of choice provided: n/a   Evaluation: no    Expected Discharge date:  19  Patient expects to be discharged to:  home  Follow Up Appointment: Best Day/ Time: Monday AM    Transportation provider: family  Transportation arrangements needed for discharge: No    Readmission Risk              Risk of Unplanned Readmission:        7             Does patient have a readmission risk score greater than 14?: No  If yes, follow-up appointment must be made within 7 days of discharge. Discharge Plan: Home with home care as needed. May need IV antibiotics and/or wound care. Currently ID is awaiting cultures for abx choice and IV vs PO decision.   Pt no longer needs

## 2019-03-17 NOTE — OP NOTE
[facility] [address]                                OPERATIVE REPORT    PATIENT NAME: Ursula Barker                      :        1945  MED REC NO:   [id number]                             ROOM:       1249  ACCOUNT NO:   [de-identified]                           ADMIT DATE: 2019  PROVIDER:     Sixto Gonzalez    DATE OF PROCEDURE:  2019    SURGEON:  Sixto Finch. MD Lisa    ASSISTANT:  Jericho Hunter. MISSY Albert    PREOPERATIVE DIAGNOSES:  Postoperative lumbar wound infection. POSTOPERATIVE DIAGNOSIS:  Postoperative lumbar wound infection. PROCEDURE:  Lumbar incision and drainage of lumbar wound infection. ANESTHESIA:  General endotracheal.    INDICATIONS:  The patient is a 66-year-old male who underwent lower back  surgery for nerve root impingement approximately 2 weeks ago. Postoperatively, the patient has experienced purulent drainage from his  incision as well as back pain with low-grade fever. He was placed on  oral antibiotics and despite these efforts continued to have ongoing  drainage with back pain. Thus, surgical intervention was recommended  for I and D of his incision. After discussing the disk as well as  potential risks and benefits of the surgery, the patient wished to  proceed with surgical intervention are recommended. DESCRIPTION OF PROCEDURE:  The patient was transported from the  preanesthesia area to the operating room. After the adequate induction  of general endotracheal anesthesia, the patient was placed onto the  operating table in the prone position onto a Cameron frame. The  appropriate pressure points about the body were carefully padded. Preoperative IV antibiotics were given. The region over the lower back  was then prepped and draped the usual sterile fashion.   The previous  incision was reopened into the subcutaneous compartment, at which point  there was spontaneous return of rosemarie pus. Intraoperative cultures were  now taken, both aerobic and anaerobic. The pus was evacuated and then  the area gently scrubbed with a gauze and irrigated with copious amounts  of bacitracin solution. The deep fascia was intact. Three of the deep  fascial sutures were removed. The deep compartment was entered. Normal  muscle could be identified. There was no evidence of infection or  purulent material identified within this layer. Thus, at this point,  the deep fascia was re-closed using multiple simple interrupted 0 Vicryl  sutures. The subcutaneous compartment was then further irrigated with  bacitracin solution. Hemostasis was obtained. A drain was then placed  in this compartment and exited superiorly in the incision site. The  subcutaneous tissue and dermis were then reapproximated using multiple  simple interrupted 2-0 Vicryl sutures. The skin edge was further  brought together with skin staples and Xeroform gauze and dry sterile  dressing applied over this. The sponge, needle, and instrument counts  were correct at the end of the case. Estimated blood loss was minimal.   The drain was connected to a closed system Hemovac. The patient was  noted to be in satisfactory condition in the operative room at this  point. Geoffrey Gross, my physician assistant, did assist throughout the entire  case.         Danny Jansen    D: 03/16/2019 10:17:13       T: 03/16/2019 10:19:13     [email or web address]  Job#: [id number]     Doc#: [id number]    CC:

## 2019-03-18 PROBLEM — M46.20 SPINAL ABSCESS (HCC): Status: ACTIVE | Noted: 2019-03-18

## 2019-03-18 LAB
ABSOLUTE EOS #: 0.14 K/UL (ref 0–0.44)
ABSOLUTE IMMATURE GRANULOCYTE: 0.06 K/UL (ref 0–0.3)
ABSOLUTE LYMPH #: 1.47 K/UL (ref 1.1–3.7)
ABSOLUTE MONO #: 0.79 K/UL (ref 0.1–1.2)
ANION GAP SERPL CALCULATED.3IONS-SCNC: 10 MMOL/L (ref 9–17)
BASOPHILS # BLD: 0 % (ref 0–2)
BASOPHILS ABSOLUTE: 0.03 K/UL (ref 0–0.2)
BUN BLDV-MCNC: 8 MG/DL (ref 8–23)
BUN/CREAT BLD: ABNORMAL (ref 9–20)
CALCIUM SERPL-MCNC: 8.5 MG/DL (ref 8.6–10.4)
CHLORIDE BLD-SCNC: 100 MMOL/L (ref 98–107)
CO2: 24 MMOL/L (ref 20–31)
CREAT SERPL-MCNC: 0.44 MG/DL (ref 0.7–1.2)
CULTURE: ABNORMAL
CULTURE: ABNORMAL
DIFFERENTIAL TYPE: ABNORMAL
DIRECT EXAM: ABNORMAL
DIRECT EXAM: ABNORMAL
EOSINOPHILS RELATIVE PERCENT: 2 % (ref 1–4)
GFR AFRICAN AMERICAN: >60 ML/MIN
GFR NON-AFRICAN AMERICAN: >60 ML/MIN
GFR SERPL CREATININE-BSD FRML MDRD: ABNORMAL ML/MIN/{1.73_M2}
GFR SERPL CREATININE-BSD FRML MDRD: ABNORMAL ML/MIN/{1.73_M2}
GLUCOSE BLD-MCNC: 126 MG/DL (ref 70–99)
HCT VFR BLD CALC: 32.1 % (ref 40.7–50.3)
HEMOGLOBIN: 10.8 G/DL (ref 13–17)
IMMATURE GRANULOCYTES: 1 %
LYMPHOCYTES # BLD: 21 % (ref 24–43)
Lab: ABNORMAL
MCH RBC QN AUTO: 32 PG (ref 25.2–33.5)
MCHC RBC AUTO-ENTMCNC: 33.6 G/DL (ref 28.4–34.8)
MCV RBC AUTO: 95 FL (ref 82.6–102.9)
MONOCYTES # BLD: 11 % (ref 3–12)
NRBC AUTOMATED: 0 PER 100 WBC
PDW BLD-RTO: 12.4 % (ref 11.8–14.4)
PLATELET # BLD: 241 K/UL (ref 138–453)
PLATELET ESTIMATE: ABNORMAL
PMV BLD AUTO: 9.7 FL (ref 8.1–13.5)
POTASSIUM SERPL-SCNC: 3.8 MMOL/L (ref 3.7–5.3)
RBC # BLD: 3.38 M/UL (ref 4.21–5.77)
RBC # BLD: ABNORMAL 10*6/UL
SEG NEUTROPHILS: 65 % (ref 36–65)
SEGMENTED NEUTROPHILS ABSOLUTE COUNT: 4.5 K/UL (ref 1.5–8.1)
SODIUM BLD-SCNC: 134 MMOL/L (ref 135–144)
SPECIMEN DESCRIPTION: ABNORMAL
VANCOMYCIN TROUGH DATE LAST DOSE: NORMAL
VANCOMYCIN TROUGH DOSE AMOUNT: NORMAL
VANCOMYCIN TROUGH TIME LAST DOSE: NORMAL
VANCOMYCIN TROUGH: 10.5 UG/ML (ref 10–20)
WBC # BLD: 7 K/UL (ref 3.5–11.3)
WBC # BLD: ABNORMAL 10*3/UL

## 2019-03-18 PROCEDURE — C1751 CATH, INF, PER/CENT/MIDLINE: HCPCS

## 2019-03-18 PROCEDURE — 85025 COMPLETE CBC W/AUTO DIFF WBC: CPT

## 2019-03-18 PROCEDURE — 80048 BASIC METABOLIC PNL TOTAL CA: CPT

## 2019-03-18 PROCEDURE — 05HY33Z INSERTION OF INFUSION DEVICE INTO UPPER VEIN, PERCUTANEOUS APPROACH: ICD-10-PCS | Performed by: NEUROLOGICAL SURGERY

## 2019-03-18 PROCEDURE — 80202 ASSAY OF VANCOMYCIN: CPT

## 2019-03-18 PROCEDURE — 99232 SBSQ HOSP IP/OBS MODERATE 35: CPT | Performed by: INTERNAL MEDICINE

## 2019-03-18 PROCEDURE — 6360000002 HC RX W HCPCS: Performed by: INTERNAL MEDICINE

## 2019-03-18 PROCEDURE — 36415 COLL VENOUS BLD VENIPUNCTURE: CPT

## 2019-03-18 PROCEDURE — 1200000000 HC SEMI PRIVATE

## 2019-03-18 PROCEDURE — 36569 INSJ PICC 5 YR+ W/O IMAGING: CPT

## 2019-03-18 PROCEDURE — 2580000003 HC RX 258: Performed by: NEUROLOGICAL SURGERY

## 2019-03-18 PROCEDURE — 6370000000 HC RX 637 (ALT 250 FOR IP): Performed by: NEUROLOGICAL SURGERY

## 2019-03-18 PROCEDURE — 2580000003 HC RX 258: Performed by: INTERNAL MEDICINE

## 2019-03-18 PROCEDURE — 76937 US GUIDE VASCULAR ACCESS: CPT

## 2019-03-18 RX ADMIN — Medication 10 ML: at 08:34

## 2019-03-18 RX ADMIN — TIZANIDINE 2 MG: 2 TABLET ORAL at 15:21

## 2019-03-18 RX ADMIN — DOCUSATE SODIUM 100 MG: 100 CAPSULE, LIQUID FILLED ORAL at 20:51

## 2019-03-18 RX ADMIN — Medication 10 ML: at 22:20

## 2019-03-18 RX ADMIN — HYDROCODONE BITARTRATE AND ACETAMINOPHEN 2 TABLET: 5; 325 TABLET ORAL at 15:25

## 2019-03-18 RX ADMIN — TIZANIDINE 2 MG: 2 TABLET ORAL at 20:51

## 2019-03-18 RX ADMIN — VANCOMYCIN HYDROCHLORIDE 1250 MG: 10 INJECTION, POWDER, LYOPHILIZED, FOR SOLUTION INTRAVENOUS at 15:21

## 2019-03-18 RX ADMIN — AMLODIPINE BESYLATE 2.5 MG: 2.5 TABLET ORAL at 08:34

## 2019-03-18 RX ADMIN — TIZANIDINE 2 MG: 2 TABLET ORAL at 08:33

## 2019-03-18 RX ADMIN — ATORVASTATIN CALCIUM 20 MG: 20 TABLET, FILM COATED ORAL at 08:33

## 2019-03-18 RX ADMIN — VANCOMYCIN HYDROCHLORIDE 1250 MG: 10 INJECTION, POWDER, LYOPHILIZED, FOR SOLUTION INTRAVENOUS at 03:36

## 2019-03-18 RX ADMIN — DOCUSATE SODIUM 100 MG: 100 CAPSULE, LIQUID FILLED ORAL at 08:33

## 2019-03-18 RX ADMIN — HYDROCODONE BITARTRATE AND ACETAMINOPHEN 2 TABLET: 5; 325 TABLET ORAL at 20:51

## 2019-03-18 ASSESSMENT — ENCOUNTER SYMPTOMS
COLOR CHANGE: 0
EYE ITCHING: 0
CONSTIPATION: 1
EYE DISCHARGE: 0
SHORTNESS OF BREATH: 0
COUGH: 0
BACK PAIN: 1
SORE THROAT: 0
ALLERGIC/IMMUNOLOGIC NEGATIVE: 1
ABDOMINAL DISTENTION: 1

## 2019-03-18 ASSESSMENT — PAIN SCALES - GENERAL
PAINLEVEL_OUTOF10: 8
PAINLEVEL_OUTOF10: 8

## 2019-03-18 NOTE — FLOWSHEET NOTE
Patient is a 74year old male who is in the hospital for a surgery on his back to clean and remove infection from his spine. Intervention- I spoke with the patient who seemed to be glad to talk about his ordeal. I inquired about his surgery and he spoke at length about his initial back surgery for a disc issue and the consequent complications from and infection. Patient became tearful as he spoke of the intense pain from the procedures of cleaning and scraping of the infection. I listened as he needed to talk about how difficult this process was for him and how he felt he couldn't survive it much longer. I inquired about his support and he told me that his wife, Ricky Robles, has been there to hold him up emotionally. I asked about how he has coped with all this and he spoke of his roz. I asked if I could pray for him and he said, \"would you, please. I would appreciate that\". He became tearful and reached out to take my hand and pulled me near to him. I prayed for healing and relief of the pain. Outcome- patient told me, Arti Jossy you so much for coming. I really appreciate it\". He is very open to spiritual care and would welcome further  visits. 03/18/19 1121   Encounter Summary   Services provided to: Patient and family together   Place of Restoration Rina'mirza Richard   Continue Visiting   (3/18/19)   Complexity of Encounter Moderate   Length of Encounter 15 minutes   Spiritual Assessment Completed Yes   Routine   Type Post-procedure   Assessment Calm; Approachable; Hopeful;Coping   Intervention Active listening;Explored feelings, thoughts, concerns;Nurtured hope;Prayer;Sustaining presence/ Ministry of presence; Discussed belief system/Temple practices/roz   Outcome Acceptance;Comfort;Expressed gratitude;Engaged in conversation;Expressed feelings/needs/concerns; Tearful;Less anxious, less agitated;Receptive

## 2019-03-18 NOTE — PLAN OF CARE
Problem: Pain:  Goal: Patient's pain/discomfort is manageable  Description  Patient's pain/discomfort is manageable  3/18/2019 0406 by Cristofer Lane RN  Outcome: Met This Shift  Note:   Adequate pain control achieved this shift. See MAR. Patient educated on nonpharmacologic pain interventions. Problem: Skin Integrity:  Goal: Skin integrity will stabilize  Description  Skin integrity will stabilize  3/18/2019 0406 by Cristofer Lane RN  Outcome: Met This Shift  Note:   Skin integrity improved/maintained this shift. Neto Score order set followed as needed. See head to toe assessment. Problem: Falls - Risk of:  Goal: Will remain free from falls  Description  Will remain free from falls  3/18/2019 0406 by Cristofer Lane RN  Outcome: Met This Shift  Note:   Patient free from falls this shift. Bed in lowest/locked position. 2/4 side rails up. Non-slip socks on. Patient educated to call out for assistance prior to ambulation. Problem: Falls - Risk of:  Goal: Absence of physical injury  Description  Absence of physical injury  3/18/2019 0406 by Cristofer Lane RN  Outcome: Met This Shift  Note:   Patient has remained free from accidental/intentional injury this shift.

## 2019-03-18 NOTE — PROGRESS NOTES
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 and outpt RN     Infection Control Recommendations   · [facility] Precautions  · Contact Isolation     Antimicrobial Stewardship Recommendations   · Simplification of therapy  · Targeted therapy    Coordination ofOutpatient Care:   · Estimated Length of IV antimicrobials: vanco iv till 4/29/19 then pull line  · Patient will need Midline / picc Catheter Insertion: picc  · Patient will need SNF:no  · Patient will need outpatient wound care: yes    History of Present Illness:   Initial history:  Bernadette Weaver is a 74y.o.-year-old male  post lumbar disc L2-3 surgery 2 weeks ago, started noticing a drainage last week and purulence then a progressive pain till he could not walk well, admitted for sx site infection, had an I&D 3/16/19 by Dr. Rafael Dangelo. cx w PC pairs and started on vanco  No fever all along till right after the sx when he felt very warm w 99.1F. No leukocytosis - has a drain in place and is able to move 4 limbs well in bed.     Interval changes  3/18/2019   No fever  No nausea, vomiting, diarrhea - is constipated  abd soft and not SOB  Back wound clean and dry, not red    Creat and wbc normal  cx SA from the wound likely MRSA HERIBERTO VANCO 1  blood cx neg      Summary of relevant labs:  Labs:  Cre: 0.65  Micro:  3/15 BC-Negative to date  3/16 BC-Negative to date  3/16 Surgical wound-Gram + cocci in pairs  Imaging:      I have personally reviewed the past medical history, past surgical history, medications, social history, and family history, and I haveupdated the database accordingly.   Past Medical History:     Past Medical History:   Diagnosis Date    CAD (coronary artery disease)     Hyperlipidemia     Lumbar herniated disc     Wears partial dentures     upper        Past Surgical  History:     Past Surgical History:   Procedure Laterality Date    ABSCESS DRAINAGE  2019    I/D Lumbar Wound    CHOLECYSTECTOMY      CORONARY ARTERY BYPASS GRAFT  10/18/2000    triple bypass/ s/p failed cardiac cath, pt states \"I coded in cardiac cath lab\"    INGUINAL HERNIA REPAIR      with mesh    LUMBAR DISC SURGERY  2019       Medications:      vancomycin  1,250 mg Intravenous Q12H    tiZANidine  2 mg Oral TID    atorvastatin  20 mg Oral Daily    amLODIPine  2.5 mg Oral Daily    sodium chloride flush  10 mL Intravenous 2 times per day    docusate sodium  100 mg Oral BID    vancomycin (VANCOCIN) intermittent dosing (placeholder)   Other RX Placeholder       Social History:     Social History     Socioeconomic History    Marital status:      Spouse name: Not on file    Number of children: Not on file    Years of education: Not on file    Highest education level: Not on file   Occupational History    Not on file   Social Needs    Financial resource strain: Not on file    Food insecurity:     Worry: Not on file     Inability: Not on file    Transportation needs:     Medical: Not on file     Non-medical: Not on file   Tobacco Use    Smoking status: Former Smoker     Last attempt to quit: 3/16/1996     Years since quittin.0    Smokeless tobacco: Never Used   Substance and Sexual Activity    Alcohol use: Not Currently     Frequency: Never    Drug use: Not on file    Sexual activity: Not on file   Lifestyle    Physical activity:     Days per week: Not on file Minutes per session: Not on file    Stress: Not on file   Relationships    Social connections:     Talks on phone: Not on file     Gets together: Not on file     Attends Zoroastrian service: Not on file     Active member of club or organization: Not on file     Attends meetings of clubs or organizations: Not on file     Relationship status: Not on file    Intimate partner violence:     Fear of current or ex partner: Not on file     Emotionally abused: Not on file     Physically abused: Not on file     Forced sexual activity: Not on file   Other Topics Concern    Not on file   Social History Narrative    Not on file       Family History:   History reviewed. No pertinent family history. Allergies:   Patient has no known allergies. Review of Systems:     Review of Systems   Constitutional: Negative for chills and fever. HENT: Negative for mouth sores and sore throat. Eyes: Negative for discharge and itching. Respiratory: Negative for cough and shortness of breath. Cardiovascular: Negative for chest pain and leg swelling. Gastrointestinal: Positive for abdominal distention and constipation. Endocrine: Negative. Genitourinary: Negative for difficulty urinating and dysuria. Musculoskeletal: Positive for back pain. Negative for joint swelling. Skin: Negative for color change and rash. Allergic/Immunologic: Negative. Neurological: Negative for dizziness, tremors and light-headedness. Hematological: Negative. Psychiatric/Behavioral: Negative for agitation, behavioral problems and confusion. Physical Examination :     Patient Vitals for the past 8 hrs:   BP Temp Temp src Pulse   03/18/19 0819 137/64 98.6 °F (37 °C) Oral 86       Physical Exam   Constitutional: He is oriented to person, place, and time. He appears well-developed and well-nourished. No distress. HENT:   Head: Normocephalic and atraumatic.    Mouth/Throat: Oropharynx is clear and moist.   Eyes: Pupils are actually reflects the content of the visit, the document can still have some errors including those of syntax and sound a like substitutions which may escape proof reading. It such instances, actual meaningcan be extrapolated by contextual diversion. Adalid Lunsford MD  Office: [phone]        I have discussed the care of the patient, including pertinent history and exam findings,  with the CNP, I have seen and examined the patient and the key elements of all parts of the encounter have been performed by me. I agree with the assessment, plan and orders as documented by the CNP.     Abigail Stover, Infectious Diseases

## 2019-03-18 NOTE — PROGRESS NOTES
[facility]  [facility].     Neurosurgery Service      Daily Progress Note           Subjective :     No major events or new complaints through the night . No changes in mental status throughout the night . No headache or Dizziness . No vision changes . No speech changes. No New Neuromuscular changes in his upper or lower extremities. Afebrile . Hemodynamically stable . Slept well  Pain  is  controlled with medications. Tolerating fluids and diet well. No difficulty swallowing . No Nausea or vomiting . No problems with urination or bowel movements. Has been ambulating with No assist.        Objective :     Vitals:    03/18/19 0021 03/18/19 0421 03/18/19 0819 03/18/19 1239   BP: (!) 125/57 115/66 137/64 (!) 129/56   Pulse: 80 86 86 72   Resp: 18 16  20   Temp: 98.1 °F (36.7 °C) 98.7 °F (37.1 °C) 98.6 °F (37 °C) 97.6 °F (36.4 °C)   TempSrc: Oral Oral Oral Oral   SpO2: 97% 94%     Weight:       Height:             Physical Examination :      Alert., Resting in bed, appears comfortable in no distress. Head: normocephalic, atraumatic, facial features unremarkable   Eyes:  PERRLA +3, EOM intact. No nystagmus or ptosis. ENT: Unremarkable. Tongue midline   Neck Supple, Normal ROM. Trachea midline . No midline spine tenderness . No step off . No pain with axial loading. No meningeal signs. No Carotid Bruit   Lungs - Clear to auscultation. No crackles or wheezes. Cardiovascular - S1, S2, regular rate and rhythm. Abdomen - soft, non-distended, non-tender, no peritoneal inflammation signs  Back:  No midline spine tenderness  Skin :  PWD. No open wounds , abrasions or contusions . No rash   Neuro-Muscular exam:  Awake and AOX4. Normal speech. Comprehension was normal. Follows simple commands. Open eyes spontaneously . GCS 15/15. PERRLA +3, EOM intact. CN 2-12 Intact . Muscle strength  RUE 5 /5  RLE  5/5  LUE  5/5   LLE 5/5 . Normal range of motion of joints .    Coordination is normal. No involuntary movements or tremors. DTR 2+ throughout, Plantar reflexes were downgoing bilaterally. Sensations Intact                 Post-op wound:    Surgical wound is . No bleeding . No evidence of CSF leak or drainage        Lab Results   Component Value Date    WBC 7.0 03/18/2019    HGB 10.8 (L) 03/18/2019    HCT 32.1 (L) 03/18/2019     03/18/2019     (L) 03/18/2019    K 3.8 03/18/2019     03/18/2019    CREATININE 0.44 (L) 03/18/2019    BUN 8 03/18/2019    CO2 24 03/18/2019           ASSESSMENT & PLAN:    This is a 74 y.o. male who presents POD #2  lumbar wound incision and drainage for history of nonhealing lumbar wound status post lumbar laminectomy 2 weeks prior.              Plan :   - Postoperatively doing well , Neurologically stable   - continue antibiotics per ID recommendation  - PICC line insertion per ID  -Continue Neuro assessment per unit protocol. -Advance diet as tolerated  -Continue GI Prophylaxis and Bowel Regimen  -Encourage ambulation / up with assist/ Advance activities as tolerated   -PT/OT         Please contact neurosurgery with any changes in patient's neurological status, any questions or concerns. Rogelio Salcido .  CNP  Neurosurgery    Cell : Cell [phone]  pager [phone]

## 2019-03-18 NOTE — DISCHARGE INSTR - COC
[id number]  · Phone:[phone]  · Fax:  · [facility] home medical equipment - [phone]  · [facility] infusion - [phone]    Dialysis Facility (if applicable)   · Name:  · Address:  · Dialysis Schedule:  · Phone:  · Fax:    / signature: Electronically signed by Dilia Chacon RN on 3/19/19 at 10:43 AM    PHYSICIAN SECTION    Prognosis: Good    Condition at Discharge: Stable    Rehab Potential (if transferring to Rehab): Good    Recommended Labs or Other Treatments After Discharge:     Physician Certification: I certify the above information and transfer of Kaushal Sales  is necessary for the continuing treatment of the diagnosis listed and that he requires Home Care for greater 30 days.      Update Admission H&P: No change in H&P    PHYSICIAN SIGNATURE:  Lazarus Hughes, MD  3/19/2019 3:13 PM

## 2019-03-19 VITALS
WEIGHT: 179.9 LBS | OXYGEN SATURATION: 97 % | BODY MASS INDEX: 25.75 KG/M2 | HEART RATE: 86 BPM | RESPIRATION RATE: 16 BRPM | SYSTOLIC BLOOD PRESSURE: 129 MMHG | TEMPERATURE: 98.1 F | DIASTOLIC BLOOD PRESSURE: 66 MMHG | HEIGHT: 70 IN

## 2019-03-19 LAB
ABSOLUTE EOS #: 0.2 K/UL (ref 0–0.44)
ABSOLUTE IMMATURE GRANULOCYTE: 0.11 K/UL (ref 0–0.3)
ABSOLUTE LYMPH #: 1.83 K/UL (ref 1.1–3.7)
ABSOLUTE MONO #: 0.84 K/UL (ref 0.1–1.2)
ANION GAP SERPL CALCULATED.3IONS-SCNC: 13 MMOL/L (ref 9–17)
BASOPHILS # BLD: 1 % (ref 0–2)
BASOPHILS ABSOLUTE: 0.05 K/UL (ref 0–0.2)
BUN BLDV-MCNC: 8 MG/DL (ref 8–23)
BUN/CREAT BLD: ABNORMAL (ref 9–20)
CALCIUM SERPL-MCNC: 9.3 MG/DL (ref 8.6–10.4)
CHLORIDE BLD-SCNC: 100 MMOL/L (ref 98–107)
CO2: 24 MMOL/L (ref 20–31)
CREAT SERPL-MCNC: 0.48 MG/DL (ref 0.7–1.2)
DIFFERENTIAL TYPE: ABNORMAL
EOSINOPHILS RELATIVE PERCENT: 3 % (ref 1–4)
GFR AFRICAN AMERICAN: >60 ML/MIN
GFR NON-AFRICAN AMERICAN: >60 ML/MIN
GFR SERPL CREATININE-BSD FRML MDRD: ABNORMAL ML/MIN/{1.73_M2}
GFR SERPL CREATININE-BSD FRML MDRD: ABNORMAL ML/MIN/{1.73_M2}
GLUCOSE BLD-MCNC: 120 MG/DL (ref 70–99)
HCT VFR BLD CALC: 34.6 % (ref 40.7–50.3)
HEMOGLOBIN: 11.5 G/DL (ref 13–17)
IMMATURE GRANULOCYTES: 2 %
LYMPHOCYTES # BLD: 24 % (ref 24–43)
MCH RBC QN AUTO: 31.7 PG (ref 25.2–33.5)
MCHC RBC AUTO-ENTMCNC: 33.2 G/DL (ref 28.4–34.8)
MCV RBC AUTO: 95.3 FL (ref 82.6–102.9)
MONOCYTES # BLD: 11 % (ref 3–12)
NRBC AUTOMATED: 0 PER 100 WBC
PDW BLD-RTO: 12.5 % (ref 11.8–14.4)
PLATELET # BLD: 287 K/UL (ref 138–453)
PLATELET ESTIMATE: ABNORMAL
PMV BLD AUTO: 9.3 FL (ref 8.1–13.5)
POTASSIUM SERPL-SCNC: 4.4 MMOL/L (ref 3.7–5.3)
RBC # BLD: 3.63 M/UL (ref 4.21–5.77)
RBC # BLD: ABNORMAL 10*6/UL
SEG NEUTROPHILS: 59 % (ref 36–65)
SEGMENTED NEUTROPHILS ABSOLUTE COUNT: 4.54 K/UL (ref 1.5–8.1)
SODIUM BLD-SCNC: 137 MMOL/L (ref 135–144)
WBC # BLD: 7.6 K/UL (ref 3.5–11.3)
WBC # BLD: ABNORMAL 10*3/UL

## 2019-03-19 PROCEDURE — 6360000002 HC RX W HCPCS: Performed by: INTERNAL MEDICINE

## 2019-03-19 PROCEDURE — 2580000003 HC RX 258: Performed by: INTERNAL MEDICINE

## 2019-03-19 PROCEDURE — 6370000000 HC RX 637 (ALT 250 FOR IP): Performed by: NEUROLOGICAL SURGERY

## 2019-03-19 PROCEDURE — 2580000003 HC RX 258: Performed by: NEUROLOGICAL SURGERY

## 2019-03-19 PROCEDURE — APPSS45 APP SPLIT SHARED TIME 31-45 MINUTES: Performed by: NURSE PRACTITIONER

## 2019-03-19 PROCEDURE — 80048 BASIC METABOLIC PNL TOTAL CA: CPT

## 2019-03-19 PROCEDURE — 36415 COLL VENOUS BLD VENIPUNCTURE: CPT

## 2019-03-19 PROCEDURE — 85025 COMPLETE CBC W/AUTO DIFF WBC: CPT

## 2019-03-19 RX ORDER — HYDROCODONE BITARTRATE AND ACETAMINOPHEN 5; 325 MG/1; MG/1
1 TABLET ORAL EVERY 4 HOURS PRN
Qty: 40 TABLET | Refills: 0 | Status: SHIPPED | OUTPATIENT
Start: 2019-03-19 | End: 2019-03-26

## 2019-03-19 RX ORDER — TIZANIDINE 2 MG/1
2 TABLET ORAL EVERY 8 HOURS PRN
Qty: 60 TABLET | Refills: 0 | Status: SHIPPED | OUTPATIENT
Start: 2019-03-19

## 2019-03-19 RX ADMIN — VANCOMYCIN HYDROCHLORIDE 1250 MG: 10 INJECTION, POWDER, LYOPHILIZED, FOR SOLUTION INTRAVENOUS at 13:41

## 2019-03-19 RX ADMIN — AMLODIPINE BESYLATE 2.5 MG: 2.5 TABLET ORAL at 08:58

## 2019-03-19 RX ADMIN — TIZANIDINE 2 MG: 2 TABLET ORAL at 13:53

## 2019-03-19 RX ADMIN — VANCOMYCIN HYDROCHLORIDE 1250 MG: 10 INJECTION, POWDER, LYOPHILIZED, FOR SOLUTION INTRAVENOUS at 02:29

## 2019-03-19 RX ADMIN — HYDROCODONE BITARTRATE AND ACETAMINOPHEN 1 TABLET: 5; 325 TABLET ORAL at 06:15

## 2019-03-19 RX ADMIN — TIZANIDINE 2 MG: 2 TABLET ORAL at 08:58

## 2019-03-19 RX ADMIN — HYDROCODONE BITARTRATE AND ACETAMINOPHEN 2 TABLET: 5; 325 TABLET ORAL at 13:55

## 2019-03-19 RX ADMIN — Medication 10 ML: at 09:01

## 2019-03-19 RX ADMIN — ATORVASTATIN CALCIUM 20 MG: 20 TABLET, FILM COATED ORAL at 08:58

## 2019-03-19 ASSESSMENT — PAIN DESCRIPTION - FREQUENCY: FREQUENCY: CONTINUOUS

## 2019-03-19 ASSESSMENT — PAIN DESCRIPTION - PROGRESSION: CLINICAL_PROGRESSION: NOT CHANGED

## 2019-03-19 ASSESSMENT — PAIN DESCRIPTION - DESCRIPTORS: DESCRIPTORS: SHARP;ACHING

## 2019-03-19 ASSESSMENT — PAIN DESCRIPTION - ONSET: ONSET: ON-GOING

## 2019-03-19 ASSESSMENT — PAIN SCALES - GENERAL
PAINLEVEL_OUTOF10: 8
PAINLEVEL_OUTOF10: 6

## 2019-03-19 ASSESSMENT — PAIN DESCRIPTION - PAIN TYPE: TYPE: SURGICAL PAIN

## 2019-03-19 ASSESSMENT — PAIN DESCRIPTION - LOCATION: LOCATION: BACK

## 2019-03-19 NOTE — PROGRESS NOTES
normal. No involuntary movements or tremors. DTR 2+ throughout, Plantar reflexes were downgoing bilaterally. Sensations Intact            Post-op wound ( lumbar )    Dressing is clean, intact and dry. No significant discharge. Surgical wound is clean.  staples  in place with good wound closure . No bleeding . No evidence of CSF leak or drainage          Lab Results   Component Value Date    WBC 7.6 03/19/2019    HGB 11.5 (L) 03/19/2019    HCT 34.6 (L) 03/19/2019     03/19/2019     03/19/2019    K 4.4 03/19/2019     03/19/2019    CREATININE 0.48 (L) 03/19/2019    BUN 8 03/19/2019    CO2 24 03/19/2019         ASSESSMENT & PLAN:      This is a 74 y. o. male who presents POD # 3  lumbar wound incision and drainage for history of nonhealing lumbar wound status post lumbar laminectomy 2 weeks prior.           Plan :     - plan to discharge today . Arrangement with infectious disease regarding IV antibiotics  Management . Follow-up in office in 4 weeks. Lonnell Carrel .  [facility]  Neurosurgery    Cell : Cell [phone]  pager [phone]

## 2019-03-19 NOTE — DISCHARGE SUMMARY
Department of Neurosurgery                                            Discharge Summary             Date : 3/19/2019  Patient Name: Andrea Meyer  YOB: 1945  Medical Record Number : [id number]  Primary Care Physician : Chele Montero MD  Admit Date : 3/16/2019  Admitting Physician :Marcio Chu   Admitting diagnosis : lumbar spinal abscess   Discharge Date :  3/19/2019  Discharge physician : Marcio Chu  Discharge diagnosis : lumbar wound incision and drainage for history of nonhealing lumbar wound status post lumbar laminectomy 2 weeks prior.         Patient Active Problem List   Diagnosis Code    Wound infection T14. 8XXA, L08.9    Acute osteomyelitis of lumbar spine ([facility] [address] M46.26    Spinal abscess ([facility]) M46.20    MRSA infection A49.02     Discharged Condition: good    PROCEDURES:  # 3  lumbar wound incision and drainage for history of nonhealing lumbar wound status post lumbar laminectomy 2 weeks prior.  [facility]      Andrea Meyer originally presented to the [facility] on 3/16/2019  7:04 AM for lumbar wound incision and drainage for history of nonhealing lumbar wound status post lumbar laminectomy 2 weeks prior.       He was admitted and taken to the OR for neurosurgery for procedure listed above. Patient tolerated all procedures well. Neurologically and Hemodynamically stable. Labs and imaging were followed daily. At time of discharge, Andrea Meyer was tolerating diet well,  DIET GENERAL;, having No problems with urination or bowel movements,ambulating on own . Pain was under control and had no complications. He is medically stable to be discharged. Physical exam on Discharge         . Alert., Resting in bed, appears comfortable in no distress.          Head: normocephalic, atraumatic, facial features unremarkable   Eyes:  PERRLA +3, EOM intact. No nystagmus or ptosis.   ENT: Unremarkable. Tongue midline   Neck Supple, Normal ROM. Trachea midline . No midline spine tenderness . No step off . No pain with axial loading.  No meningeal signs. No Carotid Bruit   Lungs - Clear to auscultation. No crackles or wheezes. Cardiovascular - S1, S2, regular rate and rhythm. Abdomen - soft, non-distended, non-tender, no peritoneal inflammation signs  Back:  No midline spine tenderness  Skin :  PWD. No open wounds , abrasions or contusions . No rash   Neuro-Muscular exam:  Awake and AOX4. Normal speech. Comprehension was normal. Follows simple commands.   Open eyes spontaneously . GCS 15/15. PERRLA +3, EOM intact. CN 2-12 Intact . Muscle strength  RUE 5 /5  RLE  5/5  LUE  5/5   LLE 5/5 . Normal range of motion of joints . Coordination is normal. No involuntary movements or tremors. DTR 2+ throughout, Plantar reflexes were downgoing bilaterally. Sensations Intact        Labs      Recent Labs     03/16/19  2357 03/18/19  0532 03/19/19  0526   WBC  --  7.0 7.6   HGB  --  10.8* 11.5*   HCT  --  32.1* 34.6*   PLT  --  241 287   NA  --  134* 137   K  --  3.8 4.4   CL  --  100 100   CO2  --  24 24   BUN 12 8 8   CREATININE 0.65* 0.44* 0.48*         I have reviewed all lab results at time of discharge which were unremarkable and stable . Radiology :  No results found. Consultations :   ID     Discharge plan  :  Home     Discharge intructions :      Discharge Medications:        Medication List      START taking these medications    vancomycin infusion  Commonly known as:  VANCOCIN  Infuse 1,250 mg intravenously every 12 hours Compound per protocol.   Pharmacy to dose and keep trough 14 -17 -  Creat and V trough 2 x /week  Cbc weekly        STOP taking these medications    cephALEXin 250 MG capsule  Commonly known as:  Laila Kurtz your doctor about these medications    amLODIPine 2.5 MG tablet  Commonly known as:  Taryn Jimenez atorvastatin 20 MG tablet  Commonly known as:  LIPITOR     MULTIVITAL-M PO     ondansetron 4 MG tablet  Commonly known as:  ZOFRAN     oxyCODONE-acetaminophen 5-325 MG per tablet  Commonly known as:  PERCOCET     tiZANidine 2 MG tablet  Commonly known as:  Floretta Pain           Where to Get Your Medications      You can get these medications from any pharmacy    Bring a paper prescription for each of these medications  · vancomycin infusion       Diet: DIET GENERAL; diet as tolerated  Activity: Provided in AVS  Wound Care: Daily and as needed  Time Spent for discharge: 30 minutes        Follow Up:     Call to schedule a follow up appointment in 4 weeks     Follow-up with Neurosurgery clinic   [facility] [address] [address] [facility]    Call             Please contact neurosurgery with any questions or concerns. José Miguel Gamble .  CNP  Neurosurgery    Cell : [phone]  pager [phone]

## 2019-03-20 NOTE — CARE COORDINATION
Discharge [facility] Case Management Department  Written by: Umesh Hunter RN    Patient Name: Ortiz Sanchez  Attending Provider: No att. providers found  Admit Date: 3/16/2019  7:04 AM  MRN: [id number]  Account: [de-identified]                     : 1945  Discharge Date: 3/19/2019      Disposition: home with [facility], [facility].     Umesh Hunter RN

## 2019-03-23 LAB
CULTURE: NORMAL
CULTURE: NORMAL
Lab: NORMAL
Lab: NORMAL
SPECIMEN DESCRIPTION: NORMAL
SPECIMEN DESCRIPTION: NORMAL

## 2019-03-26 ENCOUNTER — TELEPHONE (OUTPATIENT)
Dept: INFECTIOUS DISEASES | Age: 74
End: 2019-03-26

## 2019-03-27 DIAGNOSIS — M46.26 ACUTE OSTEOMYELITIS OF LUMBAR SPINE (HCC): ICD-10-CM

## 2019-03-27 DIAGNOSIS — L08.9 WOUND INFECTION: ICD-10-CM

## 2019-03-27 DIAGNOSIS — T14.8XXA WOUND INFECTION: ICD-10-CM

## 2019-03-27 DIAGNOSIS — M46.20 SPINAL ABSCESS (HCC): ICD-10-CM

## 2019-03-27 DIAGNOSIS — A49.02 MRSA INFECTION: ICD-10-CM

## 2019-03-29 DIAGNOSIS — T14.8XXA WOUND INFECTION: ICD-10-CM

## 2019-03-29 DIAGNOSIS — M46.26 ACUTE OSTEOMYELITIS OF LUMBAR SPINE (HCC): ICD-10-CM

## 2019-03-29 DIAGNOSIS — L08.9 WOUND INFECTION: ICD-10-CM

## 2019-03-29 DIAGNOSIS — M46.20 SPINAL ABSCESS (HCC): ICD-10-CM

## 2019-03-29 DIAGNOSIS — A49.02 MRSA INFECTION: ICD-10-CM

## 2019-04-02 ENCOUNTER — TELEPHONE (OUTPATIENT)
Dept: INFECTIOUS DISEASES | Age: 74
End: 2019-04-02

## 2019-04-02 DIAGNOSIS — T14.8XXA WOUND INFECTION: ICD-10-CM

## 2019-04-02 DIAGNOSIS — A49.02 MRSA INFECTION: ICD-10-CM

## 2019-04-02 DIAGNOSIS — M46.26 ACUTE OSTEOMYELITIS OF LUMBAR SPINE (HCC): ICD-10-CM

## 2019-04-02 DIAGNOSIS — L08.9 WOUND INFECTION: ICD-10-CM

## 2019-04-02 DIAGNOSIS — M46.20 SPINAL ABSCESS (HCC): ICD-10-CM

## 2019-04-02 NOTE — TELEPHONE ENCOUNTER
Starla Bustillos MD   0\"   2019 3:34 PM   Good Afternoon Dr. Allen Feeling from [facility] is calling re: Sebastian Hernandez (:1945) that they would like an order for Cathflo because pt's picc line is a slow return and slow receiving his medication. Please advise, Angie Moe       CALLED DR. Whitney Brunson AND P/O OK FOR CATHFLO. CALLED ARNOLDO AND INFORMED HER.

## 2019-04-03 ENCOUNTER — OFFICE VISIT (OUTPATIENT)
Dept: INFECTIOUS DISEASES | Age: 74
End: 2019-04-03
Payer: MEDICARE

## 2019-04-03 VITALS
HEART RATE: 86 BPM | TEMPERATURE: 97 F | WEIGHT: 180 LBS | RESPIRATION RATE: 14 BRPM | BODY MASS INDEX: 26.66 KG/M2 | SYSTOLIC BLOOD PRESSURE: 134 MMHG | OXYGEN SATURATION: 99 % | HEIGHT: 69 IN | DIASTOLIC BLOOD PRESSURE: 66 MMHG

## 2019-04-03 DIAGNOSIS — M46.26 ACUTE OSTEOMYELITIS OF LUMBAR SPINE (HCC): ICD-10-CM

## 2019-04-03 DIAGNOSIS — G06.1 ABSCESS IN EPIDURAL SPACE OF LUMBAR SPINE: Primary | ICD-10-CM

## 2019-04-03 PROCEDURE — G8419 CALC BMI OUT NRM PARAM NOF/U: HCPCS | Performed by: INTERNAL MEDICINE

## 2019-04-03 PROCEDURE — 3017F COLORECTAL CA SCREEN DOC REV: CPT | Performed by: INTERNAL MEDICINE

## 2019-04-03 PROCEDURE — 1123F ACP DISCUSS/DSCN MKR DOCD: CPT | Performed by: INTERNAL MEDICINE

## 2019-04-03 PROCEDURE — 99214 OFFICE O/P EST MOD 30 MIN: CPT | Performed by: INTERNAL MEDICINE

## 2019-04-03 PROCEDURE — 1111F DSCHRG MED/CURRENT MED MERGE: CPT | Performed by: INTERNAL MEDICINE

## 2019-04-03 PROCEDURE — G8427 DOCREV CUR MEDS BY ELIG CLIN: HCPCS | Performed by: INTERNAL MEDICINE

## 2019-04-03 PROCEDURE — 1036F TOBACCO NON-USER: CPT | Performed by: INTERNAL MEDICINE

## 2019-04-03 PROCEDURE — 4040F PNEUMOC VAC/ADMIN/RCVD: CPT | Performed by: INTERNAL MEDICINE

## 2019-04-03 RX ORDER — DOXYCYCLINE HYCLATE 100 MG/1
100 CAPSULE ORAL 2 TIMES DAILY
Qty: 60 CAPSULE | Refills: 0 | Status: SHIPPED | OUTPATIENT
Start: 2019-04-03 | End: 2019-05-03

## 2019-04-03 NOTE — PATIENT INSTRUCTIONS
4/4/19-Called [facility] #[phone], left a voicmail for  Monse Mccarty and gave the orders of: ((Stop vcanco on 4/16 and pull the line )) Pamela BEACH  10:41am=Saige from home care called, confirmed she received my message and that patient is going to be started on oral antibiotics when PICC is done.  Mary Alice Hess

## 2019-04-03 NOTE — PROGRESS NOTES
[facility] - Initial Consult Note  Today's Date: 4/3/2019    Impression :   · Abscess of the lumbar spine, I/D 3/16/19, MRSA  · Osteomyelitis of the LS spine  · Vancomycin. 4 weeks until 4/16, then doxycycline one month  · Post laminectomy L2-L3 disc herniation, end of February 2019  ·     Recommendations   · Stop vanco on 4/16 and pull the line   · then start doxy po 100 mg 2 x per day x 1month, avoid sun and use sunscreen w it and take it w meals one hour away from dairy, calcium and vitamins. · In length decontamination for MRSA was discussed with the patient   · See in 7 weeks   Diagnosis Orders   1. Abscess in epidural space of lumbar spine  doxycycline hyclate (VIBRAMYCIN) 100 MG capsule   2. Acute osteomyelitis of lumbar spine (HCC)  doxycycline hyclate (VIBRAMYCIN) 100 MG capsule       Return in about 7 weeks (around 5/22/2019). History of Present Illness:   Katia Mullins is a 74y.o.-year-old  male who presents with   Chief Complaint   Patient presents with    Follow-Up from Hospital     Visit of 4/3/19, patient seen at [facility] in middle of March 2019 because of his spinal wound infection. He is 74years old and had a lumbar disc surgery L2-L3 and off February but then after going home, started noticing a purulence from the surgical wound, admitted with a surgical site infection, MRSA on the debridement of the wound, done on 3/16/19, there was an abscess around the spine. Osteomyelitis was not described, but was most likely. Blood cultures were negative and he was discharged on vancomycin iv and a PICC line. At this time. He feels great. He is walking well, he drove today to my office. He still has staples present on the back until Friday in a few days to have them removed by surgery. His PICC line has been declotted today and is working fine, creatinine, CBC have been within normal range. [place]   He was due to have antibiotic for 6 weeks until oxyCODONE-acetaminophen (PERCOCET) 5-325 MG per tablet, Take 1 tablet by mouth every 8 hours as needed for Pain., Disp: , Rfl:       Social History:     Social History     Socioeconomic History    Marital status:      Spouse name: Not on file    Number of children: Not on file    Years of education: Not on file    Highest education level: Not on file   Occupational History    Not on file   Social Needs    Financial resource strain: Not on file    Food insecurity:     Worry: Not on file     Inability: Not on file    Transportation needs:     Medical: Not on file     Non-medical: Not on file   Tobacco Use    Smoking status: Former Smoker     Last attempt to quit: 3/16/1996     Years since quittin.0    Smokeless tobacco: Never Used   Substance and Sexual Activity    Alcohol use: Not Currently     Frequency: Never    Drug use: Not on file    Sexual activity: Not on file   Lifestyle    Physical activity:     Days per week: Not on file     Minutes per session: Not on file    Stress: Not on file   Relationships    Social connections:     Talks on phone: Not on file     Gets together: Not on file     Attends Jain service: Not on file     Active member of club or organization: Not on file     Attends meetings of clubs or organizations: Not on file     Relationship status: Not on file    Intimate partner violence:     Fear of current or ex partner: Not on file     Emotionally abused: Not on file     Physically abused: Not on file     Forced sexual activity: Not on file   Other Topics Concern    Not on file   Social History Narrative    Not on file       Family History:   History reviewed. No pertinent family history. Allergies:   Patient has no known allergies. Review of Systems:   Review of Systems   Constitutional: Positive for activity change. Negative for appetite change. HENT: Negative for congestion. Eyes: Negative for itching. Respiratory: Negative for apnea. Cardiovascular: Negative for chest pain. Gastrointestinal: Negative for abdominal distention. Endocrine: Negative for polyphagia. Genitourinary: Negative for frequency. Musculoskeletal: Negative for arthralgias. Skin: Negative for color change. Allergic/Immunologic: Negative for food allergies. Neurological: Negative for dizziness. Hematological: Negative for adenopathy. Psychiatric/Behavioral: Negative for agitation. Physical Examination :   Blood pressure 134/66, pulse 86, temperature 97 °F (36.1 °C), resp. rate 14, height 5' 9\" (1.753 m), weight 180 lb (81.6 kg), SpO2 99 %. Physical Exam   Constitutional: He appears well-developed and well-nourished. No distress. HENT:   Head: Normocephalic and atraumatic. Mouth/Throat: No oropharyngeal exudate. Eyes: Conjunctivae are normal. No scleral icterus. Neck: Neck supple. No tracheal deviation present. Cardiovascular: Exam reveals no friction rub. No murmur heard. Pulmonary/Chest: Effort normal. No stridor. No respiratory distress. Abdominal: Soft. He exhibits no distension. There is no tenderness. Genitourinary:   Genitourinary Comments: No Walsh, urine is clear   Musculoskeletal: He exhibits no edema or deformity. Back surgical site clean and dry with staples present   Neurological: He is alert. No cranial nerve deficit. Skin: No rash noted. He is not diaphoretic. No erythema. Psychiatric: He has a normal mood and affect.  His behavior is normal.         Medical Decision Making:   I have independently reviewed/ordered the following labs:    CBCwith Differential:   Lab Results   Component Value Date    WBC 7.6 03/19/2019    WBC 7.0 03/18/2019    HGB 11.5 03/19/2019    HGB 10.8 03/18/2019    HCT 34.6 03/19/2019    HCT 32.1 03/18/2019     03/19/2019     03/18/2019    LYMPHOPCT 24 03/19/2019    LYMPHOPCT 21 03/18/2019    MONOPCT 11 03/19/2019    MONOPCT 11 03/18/2019     BMP:  Lab Results   Component Value Date     03/19/2019     03/18/2019    K 4.4 03/19/2019    K 3.8 03/18/2019     03/19/2019     03/18/2019    CO2 24 03/19/2019    CO2 24 03/18/2019    BUN 8 03/19/2019    BUN 8 03/18/2019    CREATININE 0.48 03/19/2019    CREATININE 0.44 03/18/2019     Hepatic Function Panel: No results found for: PROT, LABALBU, BILIDIR, IBILI, BILITOT, ALKPHOS, ALT, AST  No results found for: RPR  No results found for: HIV  No results found for: [facility]  Lab Results   Component Value Date    RBC 3.63 03/19/2019    WBC 7.6 03/19/2019     Lab Results   Component Value Date    CREATININE 0.48 03/19/2019    GLUCOSE 120 03/19/2019     Thank you for allowing us to participate in the care of this patient. Please call with questions. Hyacinth Alonso MD  - Office: [phone]    Please note that this chart was generated using voice recognition Dragon dictation software. Although every effort was made to ensure the accuracy of this automated transcription, some errors in transcription mayhave occurred.

## 2019-04-09 DIAGNOSIS — M46.26 ACUTE OSTEOMYELITIS OF LUMBAR SPINE (HCC): ICD-10-CM

## 2019-04-09 DIAGNOSIS — T14.8XXA WOUND INFECTION: ICD-10-CM

## 2019-04-09 DIAGNOSIS — L08.9 WOUND INFECTION: ICD-10-CM

## 2019-04-09 DIAGNOSIS — A49.02 MRSA INFECTION: ICD-10-CM

## 2019-04-09 DIAGNOSIS — M46.20 SPINAL ABSCESS (HCC): ICD-10-CM

## 2019-04-15 ENCOUNTER — TELEPHONE (OUTPATIENT)
Dept: INFECTIOUS DISEASES | Age: 74
End: 2019-04-15

## 2019-04-15 NOTE — TELEPHONE ENCOUNTER
FAXED OFFICE NOTE TO VESNA AT [facility] TO CLARIFY IV AB ORDERS/RX  [phone]

## 2019-04-23 ASSESSMENT — ENCOUNTER SYMPTOMS
ABDOMINAL DISTENTION: 0
COLOR CHANGE: 0
EYE ITCHING: 0
APNEA: 0

## 2019-05-22 ENCOUNTER — HOSPITAL ENCOUNTER (OUTPATIENT)
Age: 74
Discharge: HOME | End: 2019-05-22
Payer: MEDICARE

## 2019-05-22 ENCOUNTER — OFFICE VISIT (OUTPATIENT)
Dept: INFECTIOUS DISEASES | Age: 74
End: 2019-05-22
Payer: MEDICARE

## 2019-05-22 VITALS
TEMPERATURE: 97 F | DIASTOLIC BLOOD PRESSURE: 64 MMHG | SYSTOLIC BLOOD PRESSURE: 127 MMHG | HEIGHT: 70 IN | BODY MASS INDEX: 26.34 KG/M2 | WEIGHT: 184 LBS | HEART RATE: 66 BPM

## 2019-05-22 DIAGNOSIS — M46.26 ACUTE OSTEOMYELITIS OF LUMBAR SPINE (HCC): Primary | ICD-10-CM

## 2019-05-22 DIAGNOSIS — A49.02 MRSA INFECTION: ICD-10-CM

## 2019-05-22 DIAGNOSIS — M46.26 ACUTE OSTEOMYELITIS OF LUMBAR SPINE (HCC): ICD-10-CM

## 2019-05-22 LAB — C-REACTIVE PROTEIN: 0.4 MG/L (ref 0–5)

## 2019-05-22 PROCEDURE — 3017F COLORECTAL CA SCREEN DOC REV: CPT | Performed by: INTERNAL MEDICINE

## 2019-05-22 PROCEDURE — 4040F PNEUMOC VAC/ADMIN/RCVD: CPT | Performed by: INTERNAL MEDICINE

## 2019-05-22 PROCEDURE — G8419 CALC BMI OUT NRM PARAM NOF/U: HCPCS | Performed by: INTERNAL MEDICINE

## 2019-05-22 PROCEDURE — 1036F TOBACCO NON-USER: CPT | Performed by: INTERNAL MEDICINE

## 2019-05-22 PROCEDURE — 1123F ACP DISCUSS/DSCN MKR DOCD: CPT | Performed by: INTERNAL MEDICINE

## 2019-05-22 PROCEDURE — 99214 OFFICE O/P EST MOD 30 MIN: CPT | Performed by: INTERNAL MEDICINE

## 2019-05-22 PROCEDURE — 36415 COLL VENOUS BLD VENIPUNCTURE: CPT

## 2019-05-22 PROCEDURE — 86140 C-REACTIVE PROTEIN: CPT

## 2019-05-22 PROCEDURE — G8427 DOCREV CUR MEDS BY ELIG CLIN: HCPCS | Performed by: INTERNAL MEDICINE

## 2019-05-22 ASSESSMENT — ENCOUNTER SYMPTOMS
APNEA: 0
EYE ITCHING: 0
COLOR CHANGE: 0
ABDOMINAL DISTENTION: 0

## 2019-05-22 NOTE — PROGRESS NOTES
[facility] - Initial Consult Note  Today's Date: 5/22/19    Impression :   · Abscess of the lumbar spine, I/D 3/16/19, MRSA  · Osteomyelitis of the LS spine  · Vancomycin. 4 weeks until 4/16, then doxycycline one month  · Post laminectomy L2-L3 disc herniation, end of February 2019  ·     Recommendations   · Stop the vancomycin on 4/16/19 and had taken 1 month of doxycycline after that until 5/16/19. · 's time. He is off antibiotics since 5/16 and he feels great. · Do not anticipate any labs of the infection, although it is not completely ruled out. · Suggests follow-up with neurosurgery, with see me when necessary. Discussed with patient and his wife   Diagnosis Orders   1. Acute osteomyelitis of lumbar spine (HCC)  C-Reactive Protein    C-Reactive Protein   2. MRSA infection  C-Reactive Protein    C-Reactive Protein       Return in about 4 months (around 9/22/2019). History of Present Illness:   Prema Hillman is a 74y.o.-year-old  male who presents with   Chief Complaint   Patient presents with    Frequent Infections     Follow up     Visit of 4/3/19,   patient seen at [facility] in middle of March 2019 because of his spinal wound infection. He is 74years old and had a lumbar disc surgery L2-L3 and off February but then after going home, started noticing a purulence from the surgical wound, admitted with a surgical site infection, MRSA on the debridement of the wound, done on 3/16/19, there was an abscess around the spine. Osteomyelitis was not described, but was most likely. Blood cultures were negative and he was discharged on vancomycin iv and a PICC line. At this time. He feels great. He is walking well, he drove today to my office. He still has staples present on the back until Friday in a few days to have them removed by surgery. His PICC line has been declotted today and is working fine, creatinine, CBC have been within normal range. [place] He was due to have antibiotic for 6 weeks until 4/29/19 but because he is doing so well, we will be able to switch him earlier to oral doxycycline. .  We also discussed in length household and personal decontamination for MRSA. Visit 5/22/19  vanco stopped early at 4 weeks 4/16/19 - doxy started x 1 mo on 4/17/19. The patient tolerated the medication very well and has been off the medication without any back pain or fever able to function very well. He went back to his normal activity  No skin sensitivity under the sun while on doxycycline. No nausea, vomiting, diarrhea. On exam, his back is showing a wound that has completely healed. I have personally reviewed the past medical history, past surgical history, medications, social history, and family history, and I haveupdated the database accordingly.   Past Medical History:     Past Medical History:   Diagnosis Date    CAD (coronary artery disease)     Hyperlipidemia     Lumbar herniated disc     MRSA (methicillin resistant staph aureus) culture positive 03/16/2019    lumbar    Wears partial dentures     upper        Past Surgical  History:     Past Surgical History:   Procedure Laterality Date    ABSCESS DRAINAGE  03/16/2019    I/D Lumbar Wound    CHOLECYSTECTOMY  2004    CORONARY ARTERY BYPASS GRAFT  10/18/2000    triple bypass/ s/p failed cardiac cath, pt states \"I coded in cardiac cath lab\"    [facility]  [facility]  3/18/2019         INCISION AND DRAINAGE N/A 3/16/2019    LUMBAR  WOUND INCISION AND DRAINAGE performed by Vanessa Lyman MD at [address]  1990's    with mesh    LUMBAR [address]  2/ 27/ 2019       Medications:     Current Outpatient Medications:     atorvastatin (LIPITOR) 20 MG tablet, Take 20 mg by mouth, Disp: , Rfl:     Multiple Vitamins-Minerals (MULTIVITAL-M PO), Take 1 tablet by mouth, Disp: , Rfl:     amLODIPine (NORVASC) 2.5 MG tablet, Take 2.5 mg by mouth daily, Disp: , Rfl:   tiZANidine (ZANAFLEX) 2 MG tablet, Take 1 tablet by mouth every 8 hours as needed (MUSCLE SPASM), Disp: 60 tablet, Rfl: 0    oxyCODONE-acetaminophen (PERCOCET) 5-325 MG per tablet, Take 1 tablet by mouth every 8 hours as needed for Pain., Disp: , Rfl:       Social History:     Social History     Socioeconomic History    Marital status:      Spouse name: Not on file    Number of children: Not on file    Years of education: Not on file    Highest education level: Not on file   Occupational History    Not on file   Social Needs    Financial resource strain: Not on file    Food insecurity:     Worry: Not on file     Inability: Not on file    Transportation needs:     Medical: Not on file     Non-medical: Not on file   Tobacco Use    Smoking status: Former Smoker     Last attempt to quit: 3/16/1996     Years since quittin.2    Smokeless tobacco: Never Used   Substance and Sexual Activity    Alcohol use: Not Currently     Frequency: Never    Drug use: Not on file    Sexual activity: Not on file   Lifestyle    Physical activity:     Days per week: Not on file     Minutes per session: Not on file    Stress: Not on file   Relationships    Social connections:     Talks on phone: Not on file     Gets together: Not on file     Attends Alevism service: Not on file     Active member of club or organization: Not on file     Attends meetings of clubs or organizations: Not on file     Relationship status: Not on file    Intimate partner violence:     Fear of current or ex partner: Not on file     Emotionally abused: Not on file     Physically abused: Not on file     Forced sexual activity: Not on file   Other Topics Concern    Not on file   Social History Narrative    Not on file       Family History:   History reviewed. No pertinent family history. Allergies:   Patient has no known allergies.      Review of Systems:   Review of Systems   Constitutional: Negative for activity change and appetite change. HENT: Negative for congestion. Eyes: Negative for photophobia, redness and itching. Respiratory: Negative for apnea and cough. Cardiovascular: Negative for chest pain. Gastrointestinal: Negative for abdominal distention. Endocrine: Negative for polyphagia. Genitourinary: Negative for flank pain and frequency. Musculoskeletal: Negative for arthralgias. Skin: Negative for color change. Allergic/Immunologic: Negative for food allergies. Neurological: Negative for dizziness. Hematological: Negative for adenopathy. Psychiatric/Behavioral: Negative for agitation. Physical Examination :   Blood pressure 127/64, pulse 66, temperature 97 °F (36.1 °C), height 5' 9.5\" (1.765 m), weight 184 lb (83.5 kg). Physical Exam   Constitutional: He is oriented to person, place, and time. He appears well-developed and well-nourished. No distress. HENT:   Head: Normocephalic and atraumatic. Mouth/Throat: No oropharyngeal exudate. Eyes: Conjunctivae are normal. No scleral icterus. Neck: Neck supple. No tracheal deviation present. Cardiovascular: Normal rate and regular rhythm. Exam reveals no friction rub. No murmur heard. Pulmonary/Chest: Effort normal. No stridor. No respiratory distress. Abdominal: Soft. He exhibits no distension. There is no tenderness. Genitourinary:   Genitourinary Comments: No Walsh, urine is clear   Musculoskeletal: He exhibits no edema or deformity. Back surgical site clean and dry with staples present   Neurological: He is alert and oriented to person, place, and time. No cranial nerve deficit. Skin: No rash noted. He is not diaphoretic. No erythema. Psychiatric: He has a normal mood and affect.  His behavior is normal.         Medical Decision Making:   I have independently reviewed/ordered the following labs:    CBCwith Differential:   Lab Results   Component Value Date    WBC 7.6 03/19/2019    WBC 7.0 03/18/2019    HGB 11.5 03/19/2019 HGB 10.8 03/18/2019    HCT 34.6 03/19/2019    HCT 32.1 03/18/2019     03/19/2019     03/18/2019    LYMPHOPCT 24 03/19/2019    LYMPHOPCT 21 03/18/2019    MONOPCT 11 03/19/2019    MONOPCT 11 03/18/2019     BMP:  Lab Results   Component Value Date     03/19/2019     03/18/2019    K 4.4 03/19/2019    K 3.8 03/18/2019     03/19/2019     03/18/2019    CO2 24 03/19/2019    CO2 24 03/18/2019    BUN 8 03/19/2019    BUN 8 03/18/2019    CREATININE 0.48 03/19/2019    CREATININE 0.44 03/18/2019     Hepatic Function Panel: No results found for: PROT, LABALBU, BILIDIR, IBILI, BILITOT, ALKPHOS, ALT, AST  No results found for: RPR  No results found for: HIV  No results found for: [facility]  Lab Results   Component Value Date    RBC 3.63 03/19/2019    WBC 7.6 03/19/2019     Lab Results   Component Value Date    CREATININE 0.48 03/19/2019    GLUCOSE 120 03/19/2019     Thank you for allowing us to participate in the care of this patient. Please call with questions. Sara Lowery MD  - Office: [phone]    Please note that this chart was generated using voice recognition Dragon dictation software. Although every effort was made to ensure the accuracy of this automated transcription, some errors in transcription mayhave occurred.

## 2019-05-23 ASSESSMENT — ENCOUNTER SYMPTOMS
EYE REDNESS: 0
COUGH: 0
PHOTOPHOBIA: 0

## 2019-09-25 ENCOUNTER — OFFICE VISIT (OUTPATIENT)
Dept: INFECTIOUS DISEASES | Age: 74
End: 2019-09-25
Payer: MEDICARE

## 2019-09-25 VITALS
HEIGHT: 69 IN | SYSTOLIC BLOOD PRESSURE: 136 MMHG | BODY MASS INDEX: 26.81 KG/M2 | WEIGHT: 181 LBS | DIASTOLIC BLOOD PRESSURE: 77 MMHG | HEART RATE: 67 BPM | RESPIRATION RATE: 16 BRPM | TEMPERATURE: 97.4 F

## 2019-09-25 DIAGNOSIS — R91.1 PULMONARY NODULE: Primary | ICD-10-CM

## 2019-09-25 DIAGNOSIS — M46.26 INFECTION OF LUMBAR SPINE (HCC): ICD-10-CM

## 2019-09-25 PROCEDURE — 1036F TOBACCO NON-USER: CPT | Performed by: INTERNAL MEDICINE

## 2019-09-25 PROCEDURE — G8427 DOCREV CUR MEDS BY ELIG CLIN: HCPCS | Performed by: INTERNAL MEDICINE

## 2019-09-25 PROCEDURE — 99213 OFFICE O/P EST LOW 20 MIN: CPT | Performed by: INTERNAL MEDICINE

## 2019-09-25 PROCEDURE — 1123F ACP DISCUSS/DSCN MKR DOCD: CPT | Performed by: INTERNAL MEDICINE

## 2019-09-25 PROCEDURE — 4040F PNEUMOC VAC/ADMIN/RCVD: CPT | Performed by: INTERNAL MEDICINE

## 2019-09-25 PROCEDURE — 3017F COLORECTAL CA SCREEN DOC REV: CPT | Performed by: INTERNAL MEDICINE

## 2019-09-25 PROCEDURE — G8419 CALC BMI OUT NRM PARAM NOF/U: HCPCS | Performed by: INTERNAL MEDICINE

## 2019-09-25 ASSESSMENT — ENCOUNTER SYMPTOMS
EYE ITCHING: 0
PHOTOPHOBIA: 0
COLOR CHANGE: 0
COUGH: 0
APNEA: 0
EYE REDNESS: 0
ABDOMINAL DISTENTION: 0

## 2020-09-23 ENCOUNTER — OFFICE VISIT (OUTPATIENT)
Dept: INFECTIOUS DISEASES | Age: 75
End: 2020-09-23
Payer: MEDICARE

## 2020-09-23 VITALS
TEMPERATURE: 98.3 F | WEIGHT: 180.2 LBS | SYSTOLIC BLOOD PRESSURE: 120 MMHG | DIASTOLIC BLOOD PRESSURE: 62 MMHG | BODY MASS INDEX: 26.69 KG/M2 | RESPIRATION RATE: 18 BRPM | OXYGEN SATURATION: 99 % | HEIGHT: 69 IN | HEART RATE: 70 BPM

## 2020-09-23 PROCEDURE — G8417 CALC BMI ABV UP PARAM F/U: HCPCS | Performed by: INTERNAL MEDICINE

## 2020-09-23 PROCEDURE — G8427 DOCREV CUR MEDS BY ELIG CLIN: HCPCS | Performed by: INTERNAL MEDICINE

## 2020-09-23 PROCEDURE — 1036F TOBACCO NON-USER: CPT | Performed by: INTERNAL MEDICINE

## 2020-09-23 PROCEDURE — 4040F PNEUMOC VAC/ADMIN/RCVD: CPT | Performed by: INTERNAL MEDICINE

## 2020-09-23 PROCEDURE — 99213 OFFICE O/P EST LOW 20 MIN: CPT | Performed by: INTERNAL MEDICINE

## 2020-09-23 PROCEDURE — 1123F ACP DISCUSS/DSCN MKR DOCD: CPT | Performed by: INTERNAL MEDICINE

## 2020-09-23 RX ORDER — ASPIRIN 81 MG/1
81 TABLET, CHEWABLE ORAL DAILY
COMMUNITY

## 2020-09-23 ASSESSMENT — ENCOUNTER SYMPTOMS
COLOR CHANGE: 0
EYE REDNESS: 0
ABDOMINAL DISTENTION: 0
COUGH: 0
PHOTOPHOBIA: 0
EYE ITCHING: 0
APNEA: 0

## 2020-09-23 NOTE — PROGRESS NOTES
[facility] - Initial Consult Note  Today's Date: 9/25/2019    Impression :   · Post laminectomy L2-L3 disc herniation, end of February 2019  · Abscess of the lumbar spine, I/D 3/16/19, MRSA  · Osteomyelitis of the LS spine - no hardware  · Vancomycin. 4 weeks until 4/16, then doxycycline one month until 5/16/2019    Recommendations   He would like to see me in 1 year, and every year. Call me earlier if there is a new problem in the back. Prefers the flu shot min he outpt pharmacy along w his wife     Diagnosis Orders   1. History of osteomyelitis         Return in about 1 year (around 9/23/2021). History of Present Illness:   Royce Palafox is a 66y.o.-year-old  male who presents with   Chief Complaint   Patient presents with    Osteomyelitis      lumbar spine     Visit of 4/3/19,   patient seen at [facility] in middle of March 2019 because of his spinal wound infection. He is 74years old and had a lumbar disc surgery L2-L3 and off February but then after going home, started noticing a purulence from the surgical wound, admitted with a surgical site infection, MRSA on the debridement of the wound, done on 3/16/19, there was an abscess around the spine. Osteomyelitis was not described, but was most likely. Blood cultures were negative and he was discharged on vancomycin iv and a PICC line. At this time. He feels great. He is walking well, he drove today to my office. He still has staples present on the back until Friday in a few days to have them removed by surgery. His PICC line has been declotted today and is working fine, creatinine, CBC have been within normal range. Rose Narvaez He was due to have antibiotic for 6 weeks until 4/29/19 but because he is doing so well, we will be able to switch him earlier to oral doxycycline. .  We also discussed in length household and personal decontamination for MRSA.       Visit 5/22/19  vanco stopped early at 4 weeks 4/16/19 - doxy started x 1 mo on 4/17/19. The patient tolerated the medication very well and has been off the medication without any back pain or fever able to function very well. He went back to his normal activity  No skin sensitivity under the sun while on doxycycline. No nausea, vomiting, diarrhea. On exam, his back is showing a wound that has completely healed. visit 9/25/19  His spinal surgical wound is completely healed it is clean, he has no back pain is able to walk without problems he is able to even left at home with no problems. He had a hematuria that recently has been evaluated by Dr. Shameka Husain, and course. He had a pulmonary nodule for which she sees  at [place] [address], right lower lobe. His symptoms were cough. On exam his wound is looking great. The patient would like to see me every year    Visit 9/23/20  Feels great and was golfing  pulm nodule smaller and no bx needed - has FU w pulm  Hard to hear - lost his dad to [facility] last month  Feels great  No back pain at all -          I have personally reviewed the past medical history, past surgical history, medications, social history, and family history, and I haveupdated the database accordingly.   Past Medical History:     Past Medical History:   Diagnosis Date    CAD (coronary artery disease)     Hyperlipidemia     Lumbar herniated disc     MRSA (methicillin resistant staph aureus) culture positive 03/16/2019    lumbar    Wears partial dentures     upper        Past Surgical  History:     Past Surgical History:   Procedure Laterality Date    ABSCESS DRAINAGE  03/16/2019    I/D Lumbar Wound    CHOLECYSTECTOMY  2004    CORONARY ARTERY BYPASS GRAFT  10/18/2000    triple bypass/ s/p failed cardiac cath, pt states \"I coded in cardiac cath lab\"    [facility]  [facility]  3/18/2019         INCISION AND DRAINAGE N/A 3/16/2019    LUMBAR  WOUND INCISION AND DRAINAGE performed by Lorena Magallanes MD at [address] [place] [address] Intimate partner violence     Fear of current or ex partner: Not on file     Emotionally abused: Not on file     Physically abused: Not on file     Forced sexual activity: Not on file   Other Topics Concern    Not on file   Social History Narrative    Not on file       Family History:   History reviewed. No pertinent family history. Allergies:   Patient has no known allergies. Review of Systems:   Review of Systems   Constitutional: Negative for activity change and appetite change. HENT: Negative for congestion. Eyes: Negative for photophobia, redness and itching. Respiratory: Negative for apnea and cough. Cardiovascular: Negative for chest pain. Gastrointestinal: Negative for abdominal distention. Endocrine: Negative for polydipsia and polyphagia. Genitourinary: Negative for flank pain and frequency. Musculoskeletal: Negative for arthralgias. Skin: Negative for color change. Allergic/Immunologic: Negative for food allergies. Neurological: Negative for dizziness and headaches. Hematological: Negative for adenopathy. Psychiatric/Behavioral: Negative for agitation. Physical Examination :   Blood pressure 120/62, pulse 70, temperature 98.3 °F (36.8 °C), temperature source Temporal, resp. rate 18, height 5' 9\" (1.753 m), weight 180 lb 3.2 oz (81.7 kg), SpO2 99 %. Physical Exam  Constitutional:       General: He is not in acute distress. Appearance: He is well-developed. He is not diaphoretic. HENT:      Head: Normocephalic and atraumatic. Mouth/Throat:      Pharynx: No oropharyngeal exudate. Eyes:      General: No scleral icterus. Conjunctiva/sclera: Conjunctivae normal.   Neck:      Musculoskeletal: Neck supple. No neck rigidity or muscular tenderness. Trachea: No tracheal deviation. Cardiovascular:      Rate and Rhythm: Normal rate and regular rhythm. Heart sounds: No murmur. No friction rub.    Pulmonary:      Effort: Pulmonary effort is normal. No respiratory distress. Breath sounds: No stridor. Abdominal:      General: There is no distension. Palpations: Abdomen is soft. Tenderness: There is no abdominal tenderness. Genitourinary:     Comments: No Walsh, urine is clear  Musculoskeletal:         General: No deformity. Comments: Back surgical site clean and dry with staples present   Skin:     Findings: No erythema or rash. Neurological:      Mental Status: He is alert and oriented to person, place, and time. Cranial Nerves: No cranial nerve deficit. Psychiatric:         Behavior: Behavior normal.           Medical Decision Making:   I have independently reviewed/ordered the following labs:    CBCwith Differential:   Lab Results   Component Value Date    WBC 7.6 03/19/2019    WBC 7.0 03/18/2019    HGB 11.5 03/19/2019    HGB 10.8 03/18/2019    HCT 34.6 03/19/2019    HCT 32.1 03/18/2019     03/19/2019     03/18/2019    LYMPHOPCT 24 03/19/2019    LYMPHOPCT 21 03/18/2019    MONOPCT 11 03/19/2019    MONOPCT 11 03/18/2019     BMP:  Lab Results   Component Value Date     03/19/2019     03/18/2019    K 4.4 03/19/2019    K 3.8 03/18/2019     03/19/2019     03/18/2019    CO2 24 03/19/2019    CO2 24 03/18/2019    BUN 8 03/19/2019    BUN 8 03/18/2019    CREATININE 0.48 03/19/2019    CREATININE 0.44 03/18/2019     Hepatic Function Panel: No results found for: PROT, LABALBU, BILIDIR, IBILI, BILITOT, ALKPHOS, ALT, AST  No results found for: RPR  No results found for: HIV  No results found for: [facility]  Lab Results   Component Value Date    RBC 3.63 03/19/2019    WBC 7.6 03/19/2019     Lab Results   Component Value Date    CREATININE 0.48 03/19/2019    GLUCOSE 120 03/19/2019     Thank you for allowing us to participate in the care of this patient. Please call with questions.       Seng Grande MD  - Office: [phone]    Please note that this chart was generated using voice recognition Dragon dictation software. Although every effort was made to ensure the accuracy of this automated transcription, some errors in transcription mayhave occurred.

## 2021-11-22 ENCOUNTER — OFFICE VISIT (OUTPATIENT)
Dept: INFECTIOUS DISEASES | Age: 76
End: 2021-11-22
Payer: MEDICARE

## 2021-11-22 VITALS
BODY MASS INDEX: 26.48 KG/M2 | OXYGEN SATURATION: 98 % | WEIGHT: 178.8 LBS | HEART RATE: 65 BPM | TEMPERATURE: 97.8 F | HEIGHT: 69 IN | SYSTOLIC BLOOD PRESSURE: 152 MMHG | RESPIRATION RATE: 18 BRPM | DIASTOLIC BLOOD PRESSURE: 74 MMHG

## 2021-11-22 DIAGNOSIS — M46.26 INFECTION OF LUMBAR SPINE (HCC): Primary | ICD-10-CM

## 2021-11-22 PROCEDURE — G8427 DOCREV CUR MEDS BY ELIG CLIN: HCPCS | Performed by: INTERNAL MEDICINE

## 2021-11-22 PROCEDURE — G8484 FLU IMMUNIZE NO ADMIN: HCPCS | Performed by: INTERNAL MEDICINE

## 2021-11-22 PROCEDURE — 1123F ACP DISCUSS/DSCN MKR DOCD: CPT | Performed by: INTERNAL MEDICINE

## 2021-11-22 PROCEDURE — 99213 OFFICE O/P EST LOW 20 MIN: CPT | Performed by: INTERNAL MEDICINE

## 2021-11-22 PROCEDURE — G8417 CALC BMI ABV UP PARAM F/U: HCPCS | Performed by: INTERNAL MEDICINE

## 2021-11-22 PROCEDURE — 4040F PNEUMOC VAC/ADMIN/RCVD: CPT | Performed by: INTERNAL MEDICINE

## 2021-11-22 PROCEDURE — 1036F TOBACCO NON-USER: CPT | Performed by: INTERNAL MEDICINE

## 2021-11-22 ASSESSMENT — ENCOUNTER SYMPTOMS
EYE REDNESS: 0
COLOR CHANGE: 0
ABDOMINAL DISTENTION: 0
BACK PAIN: 0
SHORTNESS OF BREATH: 0
PHOTOPHOBIA: 0
COUGH: 0
APNEA: 0
EYE ITCHING: 0

## 2021-11-22 NOTE — PROGRESS NOTES
[facility] - Initial Consult Note  Today's Date: 9/25/2019    Impression :   · Post laminectomy L2-L3 disc herniation, end of February 2019  · Abscess of the lumbar spine, I/D 3/16/19, MRSA  · Osteomyelitis of the LS spine - no hardware  · Vancomycin. 4 weeks until 4/16, then doxycycline one month until 5/16/2019    Recommendations   He would like to see me in 1 year, and every year. Call me earlier if there is a new problem in the back. Off AB       Diagnosis Orders   1. Infection of lumbar spine ([address]         Return in about 1 year (around 11/22/2022). History of Present Illness:   Magali Esquivel is a 66y.o.-year-old  male who presents with   Chief Complaint   Patient presents with    Osteomyelitis      no concerns      Visit of 4/3/19,   patient seen at [address] in middle of March 2019 because of his spinal wound infection. He is 74years old and had a lumbar disc surgery L2-L3 and off February but then after going home, started noticing a purulence from the surgical wound, admitted with a surgical site infection, MRSA on the debridement of the wound, done on 3/16/19, there was an abscess around the spine. Osteomyelitis was not described, but was most likely. Blood cultures were negative and he was discharged on vancomycin iv and a PICC line. At this time. He feels great. He is walking well, he drove today to my office. He still has staples present on the back until Friday in a few days to have them removed by surgery. His PICC line has been declotted today and is working fine, creatinine, CBC have been within normal range. Yasemin Garcia He was due to have antibiotic for 6 weeks until 4/29/19 but because he is doing so well, we will be able to switch him earlier to oral doxycycline. .  We also discussed in length household and personal decontamination for MRSA.       Visit 5/22/19  vanco stopped early at 4 weeks 4/16/19 - doxy started x 1 mo on 4/17/19. The patient tolerated the medication very well and has been off the medication without any back pain or fever able to function very well. He went back to his normal activity  No skin sensitivity under the sun while on doxycycline. No nausea, vomiting, diarrhea. On exam, his back is showing a wound that has completely healed. visit 9/25/19  His spinal surgical wound is completely healed it is clean, he has no back pain is able to walk without problems he is able to even left at home with no problems. He had a hematuria that recently has been evaluated by Dr. Josue Oreilly, and course. He had a pulmonary nodule for which she sees  at [place] [address], right lower lobe. His symptoms were cough. On exam his wound is looking great. The patient would like to see me every year    Visit 9/23/20  Feels great and was golfing  pulm nodule smaller and no bx needed - has FU w pulm  Hard to hear - lost his dad to [facility] last month  Feels great  No back pain at all -      VISIT 11/22/21  No back pain and no new issues from the back infection   Off infection  uptodate for the covid and flu shots  Feels good and walking well wo issues and no back pain  Still very active      I have personally reviewed the past medical history, past surgical history, medications, social history, and family history, and I haveupdated the database accordingly.   Past Medical History:     Past Medical History:   Diagnosis Date    CAD (coronary artery disease)     Hyperlipidemia     Lumbar herniated disc     MRSA (methicillin resistant staph aureus) culture positive 03/16/2019    lumbar    Wears partial dentures     upper        Past Surgical  History:     Past Surgical History:   Procedure Laterality Date    ABSCESS DRAINAGE  03/16/2019    I/D Lumbar Wound    CHOLECYSTECTOMY  2004    CORONARY ARTERY BYPASS GRAFT  10/18/2000    triple bypass/ s/p failed cardiac cath, pt states \"I coded in cardiac cath lab\"    [facility].  PICC POWERPIC SINGLE  3/18/2019         INCISION AND DRAINAGE N/A 3/16/2019    LUMBAR  WOUND INCISION AND DRAINAGE performed by Byron Cheng MD at [address]  [address]    with mesh    LUMBAR [address]  2019       Medications:     Current Outpatient Medications:     aspirin 81 MG chewable tablet, Take 81 mg by mouth daily, Disp: , Rfl:     tiZANidine (ZANAFLEX) 2 MG tablet, Take 1 tablet by mouth every 8 hours as needed (MUSCLE SPASM), Disp: 60 tablet, Rfl: 0    atorvastatin (LIPITOR) 20 MG tablet, Take 20 mg by mouth, Disp: , Rfl:     Multiple Vitamins-Minerals (MULTIVITAL-M PO), Take 1 tablet by mouth, Disp: , Rfl:     amLODIPine (NORVASC) 2.5 MG tablet, Take 2.5 mg by mouth daily, Disp: , Rfl:     oxyCODONE-acetaminophen (PERCOCET) 5-325 MG per tablet, Take 1 tablet by mouth every 8 hours as needed for Pain. (Patient not taking: Reported on 2021), Disp: , Rfl:       Social History:     Social History     Socioeconomic History    Marital status:      Spouse name: Not on file    Number of children: Not on file    Years of education: Not on file    Highest education level: Not on file   Occupational History    Not on file   Tobacco Use    Smoking status: Former Smoker     Quit date: 3/16/1996     Years since quittin.7    Smokeless tobacco: Never Used   Vaping Use    Vaping Use: Never used   Substance and Sexual Activity    Alcohol use: Not Currently    Drug use: Not on file    Sexual activity: Not on file   Other Topics Concern    Not on file   Social History Narrative    Not on file     Social Determinants of Health     Financial Resource Strain:     Difficulty of Paying Living Expenses: Not on file   Food Insecurity:     Worried About [address] in the Last Year: Not on file    [address] in the Last Year: Not on file   Transportation Needs:     Lack of Transportation (Medical): Not on file    Lack of Transportation (Non-Medical):  Not on file   Physical Activity:     Days of Exercise per Week: Not on file    Minutes of Exercise per Session: Not on file   Stress:     Feeling of Stress : Not on file   Social Connections:     Frequency of Communication with Friends and Family: Not on file    Frequency of Social Gatherings with Friends and Family: Not on file    Attends Hindu Services: Not on file    Active Member of [address] [place] [address] or Organizations: Not on file    Attends Club or Organization Meetings: Not on file    Marital Status: Not on file   Intimate Partner Violence:     Fear of Current or Ex-Partner: Not on file    Emotionally Abused: Not on file    Physically Abused: Not on file    Sexually Abused: Not on file   Housing Stability:     Unable to Pay for Housing in the Last Year: Not on file    Number of [place] in the Last Year: Not on file    Unstable Housing in the Last Year: Not on file       Family History:   History reviewed. No pertinent family history. Allergies:   Patient has no known allergies. Review of Systems:   Review of Systems   Constitutional: Negative for activity change, appetite change and diaphoresis. HENT: Negative for congestion. Eyes: Negative for photophobia, redness and itching. Respiratory: Negative for apnea, cough and shortness of breath. Cardiovascular: Negative for chest pain. Gastrointestinal: Negative for abdominal distention. Endocrine: Negative for polydipsia and polyphagia. Genitourinary: Negative for flank pain and frequency. Musculoskeletal: Negative for arthralgias and back pain. Skin: Negative for color change. Allergic/Immunologic: Negative for food allergies. Neurological: Negative for dizziness, tremors, syncope and headaches. Hematological: Negative for adenopathy. Psychiatric/Behavioral: Negative for agitation. Physical Examination :   Blood pressure (!) 152/74, pulse 65, temperature 97.8 °F (36.6 °C), temperature source Temporal, resp.  rate 18, height 5' 9\" (1.753 m), weight 178 lb 12.8 oz (81.1 kg), SpO2 98 %. Physical Exam  Constitutional:       General: He is not in acute distress. Appearance: He is well-developed. He is not diaphoretic. HENT:      Head: Normocephalic and atraumatic. Mouth/Throat:      Pharynx: No oropharyngeal exudate. Eyes:      General: No scleral icterus. Conjunctiva/sclera: Conjunctivae normal.   Neck:      Trachea: No tracheal deviation. Cardiovascular:      Rate and Rhythm: Normal rate and regular rhythm. Heart sounds: No murmur heard. No friction rub. Pulmonary:      Effort: Pulmonary effort is normal. No respiratory distress. Breath sounds: No stridor. Abdominal:      General: There is no distension. Palpations: Abdomen is soft. Tenderness: There is no abdominal tenderness. Genitourinary:     Comments: No Walsh, urine is clear  Musculoskeletal:         General: No swelling, tenderness or deformity. Cervical back: Neck supple. No rigidity. No muscular tenderness. Right lower leg: No edema. Left lower leg: No edema. Comments: Back surgical site clean and dry with staples present   Skin:     Findings: No erythema or rash. Neurological:      Mental Status: He is alert and oriented to person, place, and time. Cranial Nerves: No cranial nerve deficit.    Psychiatric:         Behavior: Behavior normal.           Medical Decision Making:   I have independently reviewed/ordered the following labs:    CBCwith Differential:   Lab Results   Component Value Date    WBC 7.6 03/19/2019    WBC 7.0 03/18/2019    HGB 11.5 03/19/2019    HGB 10.8 03/18/2019    HCT 34.6 03/19/2019    HCT 32.1 03/18/2019     03/19/2019     03/18/2019    LYMPHOPCT 24 03/19/2019    LYMPHOPCT 21 03/18/2019    MONOPCT 11 03/19/2019    MONOPCT 11 03/18/2019     BMP:  Lab Results   Component Value Date     03/19/2019     03/18/2019    K 4.4 03/19/2019    K 3.8 03/18/2019     03/19/2019     03/18/2019    CO2 24 03/19/2019    CO2 24 03/18/2019    BUN 8 03/19/2019    BUN 8 03/18/2019    CREATININE 0.48 03/19/2019    CREATININE 0.44 03/18/2019     Hepatic Function Panel: No results found for: PROT, LABALBU, BILIDIR, IBILI, BILITOT, ALKPHOS, ALT, AST  No results found for: RPR  No results found for: HIV  No results found for: [facility]  Lab Results   Component Value Date    RBC 3.63 03/19/2019    WBC 7.6 03/19/2019     Lab Results   Component Value Date    CREATININE 0.48 03/19/2019    GLUCOSE 120 03/19/2019     Thank you for allowing us to participate in the care of this patient. Please call with questions. Mago Loja MD  - Office: [phone]    Please note that this chart was generated using voice recognition Dragon dictation software. Although every effort was made to ensure the accuracy of this automated transcription, some errors in transcription mayhave occurred.

## 2022-11-07 ENCOUNTER — OFFICE VISIT (OUTPATIENT)
Dept: INFECTIOUS DISEASES | Age: 77
End: 2022-11-07
Payer: MEDICARE

## 2022-11-07 VITALS
WEIGHT: 175.6 LBS | HEIGHT: 69 IN | SYSTOLIC BLOOD PRESSURE: 133 MMHG | TEMPERATURE: 98 F | OXYGEN SATURATION: 99 % | DIASTOLIC BLOOD PRESSURE: 74 MMHG | BODY MASS INDEX: 26.01 KG/M2 | HEART RATE: 98 BPM

## 2022-11-07 DIAGNOSIS — Z86.14 HX MRSA INFECTION: Primary | ICD-10-CM

## 2022-11-07 PROCEDURE — G8417 CALC BMI ABV UP PARAM F/U: HCPCS | Performed by: INTERNAL MEDICINE

## 2022-11-07 PROCEDURE — G8428 CUR MEDS NOT DOCUMENT: HCPCS | Performed by: INTERNAL MEDICINE

## 2022-11-07 PROCEDURE — G8484 FLU IMMUNIZE NO ADMIN: HCPCS | Performed by: INTERNAL MEDICINE

## 2022-11-07 PROCEDURE — 99213 OFFICE O/P EST LOW 20 MIN: CPT | Performed by: INTERNAL MEDICINE

## 2022-11-07 PROCEDURE — 1123F ACP DISCUSS/DSCN MKR DOCD: CPT | Performed by: INTERNAL MEDICINE

## 2022-11-07 PROCEDURE — 1036F TOBACCO NON-USER: CPT | Performed by: INTERNAL MEDICINE

## 2022-11-07 ASSESSMENT — ENCOUNTER SYMPTOMS
COUGH: 0
APNEA: 0
ABDOMINAL DISTENTION: 0
EYE REDNESS: 0
EYE PAIN: 0
SHORTNESS OF BREATH: 0
BACK PAIN: 0
COLOR CHANGE: 0
EYE ITCHING: 0
PHOTOPHOBIA: 0

## 2022-11-07 NOTE — PROGRESS NOTES
[facility], [place] - Initial Consult Note  Today's Date: 9/25/2019    Impression :   Post laminectomy L2-L3 disc herniation, end of February 2019  Abscess of the lumbar spine, I/D 3/16/19, MRSA  Osteomyelitis of the LS spine - no hardware  Vancomycin. 4 weeks until 4/16, then doxycycline one month until 5/16/2019    Recommendations   He would like to see me in 1 year, and every year. -but this year he feels that he is doing so well and he agrees he does not need to come again    Off AB  Call me if issues and see me PRN  Se a dermatologist for the actinic dermatitis on the back mostly     Diagnosis Orders   1. Hx MRSA infection            No follow-ups on file. History of Present Illness:   Eric Norton is a 64y.o.-year-old  male who presents with   Chief Complaint   Patient presents with    Osteomyelitis      1 year f/u     Visit of 4/3/19,   patient seen at [facility] in middle of March 2019 because of his spinal wound infection. He is 74years old and had a lumbar disc surgery L2-L3 and off February but then after going home, started noticing a purulence from the surgical wound, admitted with a surgical site infection, MRSA on the debridement of the wound, done on 3/16/19, there was an abscess around the spine. Osteomyelitis was not described, but was most likely. Blood cultures were negative and he was discharged on vancomycin iv and a PICC line. At this time. He feels great. He is walking well, he drove today to my office. He still has staples present on the back until Friday in a few days to have them removed by surgery. His PICC line has been declotted today and is working fine, creatinine, CBC have been within normal range. Rishabh Winkler He was due to have antibiotic for 6 weeks until 4/29/19 but because he is doing so well, we will be able to switch him earlier to oral doxycycline.       .  We also discussed in length household and personal decontamination for MRSA.      Visit 5/22/19  vanco stopped early at 4 weeks 4/16/19 - doxy started x 1 mo on 4/17/19. The patient tolerated the medication very well and has been off the medication without any back pain or fever able to function very well. He went back to his normal activity  No skin sensitivity under the sun while on doxycycline. No nausea, vomiting, diarrhea. On exam, his back is showing a wound that has completely healed. visit 9/25/19  His spinal surgical wound is completely healed it is clean, he has no back pain is able to walk without problems he is able to even left at home with no problems. He had a hematuria that recently has been evaluated by Dr. Tomasa Silver, and course. He had a pulmonary nodule for which she sees  at [place] [id number], right lower lobe. His symptoms were cough. On exam his wound is looking great. The patient would like to see me every year    Visit 9/23/20  Feels great and was golfing  pulm nodule smaller and no bx needed - has FU w pulm  Hard to hear - lost his dad to Nick last month  Feels great  No back pain at all -      VISIT 11/22/21  No back pain and no new issues from the back infection   Off infection  uptodate for the covid and flu shots  Feels good and walking well wo issues and no back pain  Still very active    Visit 11/7/22  Mild back pain only if golfing and twisting bad - otherwise doing well-  Off AB  No issues last year - had dental infection after root canal and treated in spring -  Doing well otherwise   actinic dermatitis    I have personally reviewed the past medical history, past surgical history, medications, social history, and family history, and I haveupdated the database accordingly.   Past Medical History:     Past Medical History:   Diagnosis Date    CAD (coronary artery disease)     Hyperlipidemia     Lumbar herniated disc     MRSA (methicillin resistant staph aureus) culture positive 03/16/2019    lumbar    Wears partial dentures     upper Past Surgical  History:     Past Surgical History:   Procedure Laterality Date    ABSCESS DRAINAGE  2019    I/D Lumbar Wound    CHOLECYSTECTOMY      CORONARY ARTERY BYPASS GRAFT  10/18/2000    triple bypass/ s/p failed cardiac cath, pt states \"I coded in cardiac cath lab\"    [facility]  PICC POWERPIC SINGLE  3/18/2019         INCISION AND DRAINAGE N/A 3/16/2019    LUMBAR  WOUND INCISION AND DRAINAGE performed by Salvador Silverio MD at [address] [place] [address]'    with mesh    LUMBAR DISC SURGERY  2019       Medications:     Current Outpatient Medications:     aspirin 81 MG chewable tablet, Take 81 mg by mouth daily, Disp: , Rfl:     tiZANidine (ZANAFLEX) 2 MG tablet, Take 1 tablet by mouth every 8 hours as needed (MUSCLE SPASM), Disp: 60 tablet, Rfl: 0    atorvastatin (LIPITOR) 20 MG tablet, Take 20 mg by mouth, Disp: , Rfl:     Multiple Vitamins-Minerals (MULTIVITAL-M PO), Take 1 tablet by mouth, Disp: , Rfl:     amLODIPine (NORVASC) 2.5 MG tablet, Take 2.5 mg by mouth daily, Disp: , Rfl:     oxyCODONE-acetaminophen (PERCOCET) 5-325 MG per tablet, Take 1 tablet by mouth every 8 hours as needed for Pain.  (Patient not taking: No sig reported), Disp: , Rfl:       Social History:     Social History     Socioeconomic History    Marital status:      Spouse name: Not on file    Number of children: Not on file    Years of education: Not on file    Highest education level: Not on file   Occupational History    Not on file   Tobacco Use    Smoking status: Former     Types: Cigarettes     Quit date: 3/16/1996     Years since quittin.6    Smokeless tobacco: Never   Vaping Use    Vaping Use: Never used   Substance and Sexual Activity    Alcohol use: Not Currently    Drug use: Not on file    Sexual activity: Not on file   Other Topics Concern    Not on file   Social History Narrative    Not on file     Social Determinants of Health     Financial Resource Strain: Not on file   Food Insecurity: Not on file   Transportation Needs: Not on file   Physical Activity: Not on file   Stress: Not on file   Social Connections: Not on file   Intimate Partner Violence: Not on file   Housing Stability: Not on file       Family History:   No family history on file. Allergies:   Patient has no known allergies. Review of Systems:   Review of Systems   Constitutional:  Negative for activity change, appetite change, diaphoresis and fatigue. HENT:  Negative for congestion. Eyes:  Negative for photophobia, pain, redness and itching. Respiratory:  Negative for apnea, cough and shortness of breath. Cardiovascular:  Negative for chest pain. Gastrointestinal:  Negative for abdominal distention. Endocrine: Negative for polydipsia and polyphagia. Genitourinary:  Negative for flank pain and frequency. Musculoskeletal:  Negative for arthralgias and back pain. Skin:  Negative for color change. Allergic/Immunologic: Negative for food allergies. Neurological:  Negative for dizziness, tremors, syncope and headaches. Hematological:  Negative for adenopathy. Psychiatric/Behavioral:  Negative for agitation. Physical Examination :   Blood pressure 133/74, pulse 98, temperature 98 °F (36.7 °C), temperature source Temporal, height 5' 9\" (1.753 m), weight 175 lb 9.6 oz (79.7 kg), SpO2 99 %. Physical Exam  Constitutional:       General: He is not in acute distress. Appearance: He is well-developed. He is not diaphoretic. HENT:      Head: Normocephalic and atraumatic. Mouth/Throat:      Pharynx: No oropharyngeal exudate. Eyes:      General: No scleral icterus. Conjunctiva/sclera: Conjunctivae normal.   Neck:      Trachea: No tracheal deviation. Cardiovascular:      Rate and Rhythm: Normal rate and regular rhythm. Heart sounds: No murmur heard. No friction rub. Pulmonary:      Effort: Pulmonary effort is normal. No respiratory distress. Breath sounds:  No stridor. Abdominal:      General: There is no distension. Palpations: Abdomen is soft. Tenderness: There is no abdominal tenderness. Genitourinary:     Comments: No Walsh, urine is clear  Musculoskeletal:         General: No swelling, tenderness or deformity. Cervical back: Neck supple. No rigidity. No muscular tenderness. Right lower leg: No edema. Left lower leg: No edema. Comments: Back surgical site clean and dry with staples present   Skin:     Coloration: Skin is not jaundiced. Findings: No bruising, erythema or rash. Neurological:      Mental Status: He is alert and oriented to person, place, and time. Cranial Nerves: No cranial nerve deficit.    Psychiatric:         Behavior: Behavior normal.         Medical Decision Making:   I have independently reviewed/ordered the following labs:    CBCwith Differential:   Lab Results   Component Value Date/Time    WBC 7.6 03/19/2019 05:26 AM    WBC 7.0 03/18/2019 05:32 AM    HGB 11.5 03/19/2019 05:26 AM    HGB 10.8 03/18/2019 05:32 AM    HCT 34.6 03/19/2019 05:26 AM    HCT 32.1 03/18/2019 05:32 AM     03/19/2019 05:26 AM     03/18/2019 05:32 AM    LYMPHOPCT 24 03/19/2019 05:26 AM    LYMPHOPCT 21 03/18/2019 05:32 AM    MONOPCT 11 03/19/2019 05:26 AM    MONOPCT 11 03/18/2019 05:32 AM     BMP:  Lab Results   Component Value Date/Time     03/19/2019 05:26 AM     03/18/2019 05:32 AM    K 4.4 03/19/2019 05:26 AM    K 3.8 03/18/2019 05:32 AM     03/19/2019 05:26 AM     03/18/2019 05:32 AM    CO2 24 03/19/2019 05:26 AM    CO2 24 03/18/2019 05:32 AM    BUN 8 03/19/2019 05:26 AM    BUN 8 03/18/2019 05:32 AM    CREATININE 0.48 03/19/2019 05:26 AM    CREATININE 0.44 03/18/2019 05:32 AM     Hepatic Function Panel: No results found for: PROT, LABALBU, BILIDIR, IBILI, BILITOT, ALKPHOS, ALT, AST  No results found for: RPR  No results found for: HIV  No results found for: [facility]  Lab Results   Component Value Date/Time    RBC 3.63 03/19/2019 05:26 AM    WBC 7.6 03/19/2019 05:26 AM     Lab Results   Component Value Date/Time    CREATININE 0.48 03/19/2019 05:26 AM    GLUCOSE 120 03/19/2019 05:26 AM     Thank you for allowing us to participate in the care of this patient. Please call with questions. Magui Jimenez MD  - Office: [phone]    Please note that this chart was generated using voice recognition Dragon dictation software. Although every effort was made to ensure the accuracy of this automated transcription, some errors in transcription mayhave occurred.

## (undated) DEVICE — CODMAN® SURGICAL PATTIES 1/2" X 1/2" (1.27CM X 1.27CM): Brand: CODMAN®

## (undated) DEVICE — SUTURE VCRL SZ 3-0 L18IN ABSRB VLT L26MM SH 1/2 CIR J774D

## (undated) DEVICE — GARMENT,MEDLINE,DVT,INT,CALF,MED, GEN2: Brand: MEDLINE

## (undated) DEVICE — GLOVE SURG SZ 65 THK91MIL LTX FREE SYN POLYISOPRENE

## (undated) DEVICE — MITT PREP W575XL775IN POVIDONE IOD HAIR REMV

## (undated) DEVICE — DISPOSABLE BIPOLAR CABLE, 12FT (3.6M): Brand: KIRWAN

## (undated) DEVICE — SPONGE LAP W18XL18IN WHT COT 4 PLY FLD STRUNG RADPQ DISP ST

## (undated) DEVICE — PROTECTOR ULN NRV PUR FOAM HK LOOP STRP ANATOMICALLY

## (undated) DEVICE — CONTAINER,SPECIMEN,4OZ,OR STRL: Brand: MEDLINE

## (undated) DEVICE — 3M™ IOBAN™ 2 ANTIMICROBIAL INCISE DRAPE 6650EZ: Brand: IOBAN™ 2

## (undated) DEVICE — CULTURETTE AERO/ANEROBIC RED/BLUE BUNDLE

## (undated) DEVICE — SUTURE D SPEC VCRL 2 0 D8876

## (undated) DEVICE — 3M™ PRECISE™ VISTA DISPOSABLE SKIN STAPLER 3995: Brand: 3M™ PRECISE™

## (undated) DEVICE — 1000 S-DRAPE TOWEL DRAPE 10/BX: Brand: STERI-DRAPE™

## (undated) DEVICE — SUTURE VCRL SZ 0 L18IN ABSRB UD L36MM CT-1 1/2 CIR J840D

## (undated) DEVICE — DRAPE ADOLESCENT  LAPAROTOMY

## (undated) DEVICE — PATIENT RETURN ELECTRODE, SINGLE-USE, CONTACT QUALITY MONITORING, ADULT, WITH 9FT CORD, FOR PATIENTS WEIGING OVER 33LBS. (15KG): Brand: MEGADYNE

## (undated) DEVICE — STANDARD HYPODERMIC NEEDLE,POLYPROPYLENE HUB: Brand: MONOJECT

## (undated) DEVICE — SUTURE VCRL SZ 2-0 L27IN ABSRB UD L36MM CT-1 1/2 CIR JJ42G

## (undated) DEVICE — MARKER,SKIN,WI/RULER AND LABELS: Brand: MEDLINE

## (undated) DEVICE — DRAPE,REIN 53X77,STERILE: Brand: MEDLINE

## (undated) DEVICE — CONTROL SYRINGE LUER-LOCK TIP: Brand: MONOJECT

## (undated) DEVICE — DRESSING,GAUZE,XEROFORM,CURAD,1"X8",ST: Brand: CURAD

## (undated) DEVICE — SOLUTION SCRB 4OZ 4% CHG H2O AIDED FOR PREOPERATIVE SKIN

## (undated) DEVICE — SOLUTION SURG PREP POV IOD 7.5% 4 OZ

## (undated) DEVICE — PACK PROCEDURE SURG LUMBAR SPINE SVMMC

## (undated) DEVICE — DRESSING BORDERED ADH GZ UNIV GEN USE 8INX4IN AND 6INX2IN

## (undated) DEVICE — BLADE CLP TAPR HD WET DRY CAPABILITY GTT IN CHARGING USE